# Patient Record
Sex: MALE | Race: WHITE | NOT HISPANIC OR LATINO | Employment: FULL TIME | URBAN - METROPOLITAN AREA
[De-identification: names, ages, dates, MRNs, and addresses within clinical notes are randomized per-mention and may not be internally consistent; named-entity substitution may affect disease eponyms.]

---

## 2021-05-28 ENCOUNTER — TELEPHONE (OUTPATIENT)
Dept: SCHEDULING | Facility: IMAGING CENTER | Age: 56
End: 2021-05-28

## 2021-06-02 ENCOUNTER — TELEPHONE (OUTPATIENT)
Dept: MEDICAL GROUP | Facility: PHYSICIAN GROUP | Age: 56
End: 2021-06-02

## 2021-06-02 NOTE — TELEPHONE ENCOUNTER
Future Appointments       Provider Department Center    6/9/2021 3:00 PM Delbert Reed M.D. [facility] VIS        NEW PATIENT VISIT PRE-VISIT PLANNING    1.  EpicCare Patient is checked in Patient Demographics?Yes    2.  Immunizations were updated in Epic using Reconcile Outside Information activity? Yes         3.  Is this appointment scheduled as a Hospital Follow-Up? No    4.  Patient is due for the following Health Maintenance Topics:   Health Maintenance Due   Topic Date Due   • HEPATITIS C SCREENING  Never done   • COVID-19 Vaccine (1) Never done   • COLONOSCOPY  Never done   • IMM ZOSTER VACCINES (1 of 2) Never done     5.  Reviewed/Updated the following with patient:       •   Preferred Pharmacy? Yes       •   Preferred Lab? Yes       •   Preferred Communication? Yes       •   Allergies? Yes       •   Medications? YES. Was Abstract Encounter opened and chart updated? YES       •   Social History? Yes       •   Family History (document living status of immediate family members and if + hx of  cancer, diabetes, hypertension, hyperlipidemia, heart attack, stroke) Yes    6.  Updated Care Team?       •   DME Company (gait device, O2, CPAP, etc.) NO       •   Other Specialists (eye doctor, derm, GYN, cardiology, endo, etc): N\A    7.  AHA (Puls8) form printed for Provider? N/A   Spoke with patient not a good time, will call again on 06/03/21

## 2021-06-09 ENCOUNTER — OFFICE VISIT (OUTPATIENT)
Dept: MEDICAL GROUP | Facility: PHYSICIAN GROUP | Age: 56
End: 2021-06-09
Payer: COMMERCIAL

## 2021-06-09 VITALS
TEMPERATURE: 98.1 F | WEIGHT: 179 LBS | SYSTOLIC BLOOD PRESSURE: 120 MMHG | HEART RATE: 80 BPM | HEIGHT: 70 IN | OXYGEN SATURATION: 95 % | DIASTOLIC BLOOD PRESSURE: 70 MMHG | RESPIRATION RATE: 16 BRPM | BODY MASS INDEX: 25.62 KG/M2

## 2021-06-09 DIAGNOSIS — E84.9 CYSTIC FIBROSIS (HCC): ICD-10-CM

## 2021-06-09 DIAGNOSIS — K86.81 EXOCRINE PANCREATIC INSUFFICIENCY: ICD-10-CM

## 2021-06-09 DIAGNOSIS — M13.0 POLYARTHROPATHY: ICD-10-CM

## 2021-06-09 DIAGNOSIS — K21.9 GASTROESOPHAGEAL REFLUX DISEASE, UNSPECIFIED WHETHER ESOPHAGITIS PRESENT: ICD-10-CM

## 2021-06-09 DIAGNOSIS — E78.00 HYPERCHOLESTEREMIA: ICD-10-CM

## 2021-06-09 DIAGNOSIS — H90.5 SENSORINEURAL HEARING LOSS, UNILATERAL: ICD-10-CM

## 2021-06-09 DIAGNOSIS — Z86.010 HISTORY OF COLONIC POLYPS: ICD-10-CM

## 2021-06-09 DIAGNOSIS — Z13.228 SCREENING FOR METABOLIC DISORDER: ICD-10-CM

## 2021-06-09 DIAGNOSIS — M06.4 INFLAMMATORY POLYARTHRITIS (HCC): ICD-10-CM

## 2021-06-09 DIAGNOSIS — M12.30 PALINDROMIC RHEUMATISM: ICD-10-CM

## 2021-06-09 PROBLEM — K64.8 INTERNAL HEMORRHOIDS: Status: ACTIVE | Noted: 2020-02-21

## 2021-06-09 PROBLEM — Z00.00 HEALTHCARE MAINTENANCE: Status: ACTIVE | Noted: 2021-06-09

## 2021-06-09 PROBLEM — N46.11 OLIGOSPERMIA: Status: ACTIVE | Noted: 2021-06-09

## 2021-06-09 PROCEDURE — 99204 OFFICE O/P NEW MOD 45 MIN: CPT | Performed by: STUDENT IN AN ORGANIZED HEALTH CARE EDUCATION/TRAINING PROGRAM

## 2021-06-09 ASSESSMENT — PATIENT HEALTH QUESTIONNAIRE - PHQ9: CLINICAL INTERPRETATION OF PHQ2 SCORE: 0

## 2021-06-09 NOTE — ASSESSMENT & PLAN NOTE
influ vaccine - n/a  Pneumo Vaccine - 2015...   ...  will need at 64 as well....  Tetanus - 2018   Shingles - will get at pharmacy....   Colonoscopy - [place] couple years ago ([place])....    .... will be due in 1 year.....  Hep.C.screen - done 2014.- normal.   PSA - n/a   Dexa - n/a  Labs < 12 mo / met screen - ordered.

## 2021-06-09 NOTE — ASSESSMENT & PLAN NOTE
He notes dx of gerd, but states not gerd. ....  He takes PPI, to lower acidity,      to help enzymes remain active when they go      through the stomach, and protective with Naproxen.  Reportedly  Neg endoscopy 1993.  Declines local GI for now.   - can continue on PPI.

## 2021-06-09 NOTE — ASSESSMENT & PLAN NOTE
Patient with late dx of CF.   Taking pancrealipase for this; notes it helps.   Also takes routine Miralax, due to CF.   Per policy of prior CF / GI team.   - declines local GI referral, for now.   - can continue with enzymes and miralax, for now.

## 2021-06-09 NOTE — PROGRESS NOTES
New to Provider  (and [facility])      Gene Tate is a 56 y.o. male.    Reason to establish: New patient to establish      Chief Complaint   Patient presents with   • Establish Care     -  New to [place].             Problems discussed this visit:    This note uses problem-based documentation.  Subjective HPI is included in Assessment & Plan, at bottom of note.   Problem   Esophageal Reflux   Healthcare Maintenance   Internal Hemorrhoids   Hypercholesteremia   Exocrine Pancreatic Insufficiency   Inflammatory Polyarthritis (Hcc)   Palindromic Rheumatism   History of Colonic Polyps   Polyarthropathy   Cystic Fibrosis (Hcc)   Sensorineural Hearing Loss, Unilateral                          Past Medical History:   Diagnosis Date   • Arthritis    • Cystic fibrosis (HCC)    • Exocrine pancreatic insufficiency 11/23/2016   • History of colonic polyps 07/29/2014    2 tubular adenomas - 2014, and again 2019. ... q3y    • Hypercholesteremia 11/30/2018       History reviewed. No pertinent surgical history.       Family History   Problem Relation Age of Onset   • No Known Problems Mother    • No Known Problems Father    • Cystic Fibrosis Maternal Uncle        Social History     Tobacco Use   • Smoking status: Never Smoker   • Smokeless tobacco: Never Used   Substance Use Topics   • Alcohol use: Yes     Comment: 6 drinks per week       Current medications:  (including new changes):  Current Outpatient Medications   Medication Sig Dispense Refill   • predniSONE (DELTASONE) 10 MG Tab 2 TABS DAILY FOR 2 DAYS THEN 1 DAILY FOR 2 DAYS AS NEEDED FOR ARTHRITIS FLARE INDICATIONS: INFLAMMATION OF MORE THAN ONE JOINT     • polyethylene glycol 3350 (MIRALAX) 17 GM/SCOOP Powder Take 17 g by mouth every day.     • Pancrelipase, Lip-Prot-Amyl, 58083-62540 units Cap DR Particles TAKE 8 CAPSULES 3 TIMES A  DAY WITH MEALS AND 4       CAPSULES 3 TIMES A DAY WITHSNACKS     • omeprazole (PRILOSEC) 40 MG delayed-release capsule  "TAKE 1 CAPSULE DAILY     • naproxen (NAPROSYN) 500 MG Tab TAKE 1 TABLET DAILY AND TAKE A SECOND TABLET IF NEEDED FOR PAIN/INFLAMMATION OF MORE THAN 1 JOINT     • methotrexate 2.5 MG Tab TAKE 8 TABLETS ONCE WEEKLY TAKE 4 TABLETS WITH BREAKFAST AND 4TABLETS WITH LUNCH     • hydroxychloroquine (PLAQUENIL) 200 MG Tab Take 2 Tabs by mouth every day.     • folic acid (FOLVITE) 1 MG Tab TAKE 1 TABLET DAILY     • Jdsblfvw-Izlnkvk-Pjiqzs&Ivacaf 100-50-75 & 150 MG Tablet Therapy Pack Trikafta     • zolpidem (AMBIEN) 10 MG Tab Take 5-10 mg by mouth.       No current facility-administered medications for this visit.       Allergies as of 06/09/2021 - Reviewed 06/09/2021   Allergen Reaction Noted   • Sulfa drugs Hives 11/16/2000                    Review of Symptoms  (as noted elsewhere, and .....)    GEN/CONST:   Denies fever, chills, fatigue,    CARDIO:   Denies chest pain, palpitations, or edema.    RESP:   Denies shortness of breath, wheezing, or coughing.  ... but did notice improvement with Trikafta.  GI:    Denies nausea, vomiting,  ... notes some discomfort, and taking pancreatic enyzmes and miralax.    :   Denies dysuria, hematuria,     MSK:  Denies joint pains  or muscle aches.    NEURO:  Denies numbness or focal weakness.       PSYCH:  Denies anxiety, depression, or substance abuse        Physical Exam  /70 (BP Location: Left arm, Patient Position: Sitting, BP Cuff Size: Adult)   Pulse 80   Temp 36.7 °C (98.1 °F) (Temporal)   Resp 16   Ht 1.778 m (5' 10\")   Wt 81.2 kg (179 lb)   SpO2 95%   BMI 25.68 kg/m²   General:  Alert and oriented, No apparent distress.  Neck: Supple. No lymphadenopathy noted. Thyroid not enlarged.   Lungs: Clear to auscultation bilaterally.  No wheezes or rales.     Cardiovascular: Regular rate and rhythm.  No murmurs, or gallops.  Abdomen:  Soft.  Non-distended.  Non-tender.     Extremities: No pedal edema.  Good general motion of extremities.    Psychological: Appears to have " "normal mood and affect.        Labs:  Not able to review old/prior labs well.    (intermittent access to outside records / online).   - ordering new labs.                   Health Maintenance Review     influ vaccine - n/a  Pneumo Vaccine - 2015...   ...  will need at 64 as well....  Tetanus - 2018   Shingles - will get at pharmacy....   Colonoscopy - [place] couple years ago ([place])....    .... will be due in 1 year.....  Hep.C.screen - done 2014.- normal.   PSA - n/a   Dexa - n/a  Labs < 12 mo / met screen - ordered.                                  Assessment & Plan  (with subjective history and orders):  Of note, the list below is not in a specific nor sortable order.      Problem List Items Addressed This Visit     Cystic fibrosis (HCC)     Patient notes having frequent illness all his life, but only diagnosed with CF at age 49, after friend recognized some of the odd symptoms (and they happened to be a ), so was tested and found to have CF.   Previously told \"Mild cystic fibrosis largely manifest with pancreatic insufficiency, mild pulmonary involvement and recent treatment for palindromic rheumatism with hydroxychloroquine, naproxen, and methotrexate (2017).\"  ... from online import.  He notes mild lung symptoms, which improved after starting Trikafta.  ...  Mostly GI complaints which are helped with Pancreatic enzymes.   - continues to follow-up with [place] for this issue.   - declines referral to local GI for now (but will need one in a year, for future colonoscopy follow-up).   - [place] to manage, for now.          Relevant Orders    CBC WITH DIFFERENTIAL    Comp Metabolic Panel    Esophageal reflux (more medication management)     He notes dx of gerd, but states not gerd. ....  He takes PPI, to lower acidity,      to help enzymes remain active when they go      through the stomach, and protective with Naproxen.  Reportedly  Neg endoscopy 1993.  Declines local GI for now.   - can continue on PPI.     "       Exocrine pancreatic insufficiency     Patient with late dx of CF.   Taking pancrealipase for this; notes it helps.   Also takes routine Miralax, due to CF.   Per policy of prior CF / GI team.   - declines local GI referral, for now.   - can continue with enzymes and miralax, for now.          History of colonic polyps     Last colonoscopy in 2019, with 2 polyps.   Online record import notes they were tubular adenomas.   Told to follow-up in 3 years... (next in 2022).   - notes he is willing to follow-up with this next year.         Hypercholesteremia     Patient unaware of prior cholesterol issues.   Had been listed in imported problem list.   Prior labs with elevated LDL and total Cholesterol.  Denies:  angina, palpitations, or dyspnea   - will get new labs for further evaluation.          Relevant Orders    Lipid Profile    Inflammatory polyarthritis (HCC)    Relevant Orders    REFERRAL TO RHEUMATOLOGY    Palindromic rheumatism     Patient notes recurrent issues with variable arthritis.   He states he will have flairs of various different joints  (usually one at a time), where an individual joint will  become large, swollen, warm, and tender....  He states he has been told it is related to his CF.   Has been using short-course of steroids, when it flairs.  Uses prednisone:     Rx for 20 mg for 2 days, then 10 mg for 2 days ....        But states he takes it as ...   30 mg d, for 1-2 days.   Currently not an issue (not active).   - listed for reference.          Relevant Orders    REFERRAL TO RHEUMATOLOGY    Polyarthropathy    Relevant Orders    REFERRAL TO RHEUMATOLOGY    Sensorineural hearing loss, unilateral     Notes rare episodes of hearing loss.....  He states he has a published paper about it at home.   He states that he takes moderate to high dose steroids,  temporarily when this happens....  (he's uncertain, but thinks it's 80 mg d, for a few days).   (... UpToDate notes 60 mg d, for 10 days)  (...  and if bad, then intratympanic dexamethasone inj, weekly for 3 weeks)   - not current issue. (not active).   - listed for reference.         Relevant Orders    CBC WITH DIFFERENTIAL    Comp Metabolic Panel      Other Visit Diagnoses     Screening for metabolic disorder        Relevant Orders    CBC WITH DIFFERENTIAL    Comp Metabolic Panel    Lipid Profile        Of note, the above list is not in a specific nor sortable order.                  Diagnosis Table, with orders:  1. Cystic fibrosis (HCC)  CBC WITH DIFFERENTIAL    Comp Metabolic Panel   2. Exocrine pancreatic insufficiency     3. Gastroesophageal reflux disease, unspecified whether esophagitis present     4. Hypercholesteremia  Lipid Profile   5. Screening for metabolic disorder  CBC WITH DIFFERENTIAL    Comp Metabolic Panel    Lipid Profile   6. Palindromic rheumatism  REFERRAL TO RHEUMATOLOGY   7. Inflammatory polyarthritis (HCC)  REFERRAL TO RHEUMATOLOGY   8. Polyarthropathy  REFERRAL TO RHEUMATOLOGY   9. Sensorineural hearing loss, unilateral  CBC WITH DIFFERENTIAL    Comp Metabolic Panel   10. History of colonic polyps                   Follow-up:    3 weeks (general review / Labs)              A computerized dictation system may have been used in this note.    Despite review, there may be some errors in spelling or grammar.    Delbert Reed M.D.  6/9/2021

## 2021-06-09 NOTE — ASSESSMENT & PLAN NOTE
Notes rare episodes of hearing loss.....  He states he has a published paper about it at home.   He states that he takes moderate to high dose steroids,  temporarily when this happens....  (he's uncertain, but thinks it's 80 mg d, for a few days).   (... UpToDate notes 60 mg d, for 10 days)  (... and if bad, then intratympanic dexamethasone inj, weekly for 3 weeks)   - not current issue. (not active).   - listed for reference.

## 2021-06-09 NOTE — ASSESSMENT & PLAN NOTE
Patient unaware of prior cholesterol issues.   Had been listed in imported problem list.   Prior labs with elevated LDL and total Cholesterol.  Denies:  angina, palpitations, or dyspnea   - will get new labs for further evaluation.

## 2021-06-09 NOTE — ASSESSMENT & PLAN NOTE
"Patient notes having frequent illness all his life, but only diagnosed with CF at age 49, after friend recognized some of the odd symptoms (and they happened to be a ), so was tested and found to have CF.   Previously told \"Mild cystic fibrosis largely manifest with pancreatic insufficiency, mild pulmonary involvement and recent treatment for palindromic rheumatism with hydroxychloroquine, naproxen, and methotrexate (2017).\"  ... from online import.  He notes mild lung symptoms, which improved after starting Trikafta.  ...  Mostly GI complaints which are helped with Pancreatic enzymes.   - continues to follow-up with [facility] for this issue.   - declines referral to local GI for now (but will need one in a year, for future colonoscopy follow-up).   - [place] to manage, for now.   "

## 2021-06-10 NOTE — ASSESSMENT & PLAN NOTE
Last colonoscopy in 2019, with 2 polyps.   Online record import notes they were tubular adenomas.   Told to follow-up in 3 years... (next in 2022).   - notes he is willing to follow-up with this next year.

## 2021-06-25 ENCOUNTER — HOSPITAL ENCOUNTER (OUTPATIENT)
Dept: LAB | Facility: MEDICAL CENTER | Age: 56
End: 2021-06-25
Attending: STUDENT IN AN ORGANIZED HEALTH CARE EDUCATION/TRAINING PROGRAM
Payer: COMMERCIAL

## 2021-06-25 DIAGNOSIS — E78.00 HYPERCHOLESTEREMIA: ICD-10-CM

## 2021-06-25 DIAGNOSIS — H90.5 SENSORINEURAL HEARING LOSS, UNILATERAL: ICD-10-CM

## 2021-06-25 DIAGNOSIS — Z13.228 SCREENING FOR METABOLIC DISORDER: ICD-10-CM

## 2021-06-25 DIAGNOSIS — E84.9 CYSTIC FIBROSIS (HCC): ICD-10-CM

## 2021-06-25 LAB
ALBUMIN SERPL BCP-MCNC: 4.4 G/DL (ref 3.2–4.9)
ALBUMIN/GLOB SERPL: 1.8 G/DL
ALP SERPL-CCNC: 79 U/L (ref 30–99)
ALT SERPL-CCNC: 23 U/L (ref 2–50)
ANION GAP SERPL CALC-SCNC: 11 MMOL/L (ref 7–16)
AST SERPL-CCNC: 19 U/L (ref 12–45)
BASOPHILS # BLD AUTO: 0.3 % (ref 0–1.8)
BASOPHILS # BLD: 0.02 K/UL (ref 0–0.12)
BILIRUB SERPL-MCNC: 0.5 MG/DL (ref 0.1–1.5)
BUN SERPL-MCNC: 23 MG/DL (ref 8–22)
CALCIUM SERPL-MCNC: 9.5 MG/DL (ref 8.5–10.5)
CHLORIDE SERPL-SCNC: 104 MMOL/L (ref 96–112)
CHOLEST SERPL-MCNC: 235 MG/DL (ref 100–199)
CO2 SERPL-SCNC: 24 MMOL/L (ref 20–33)
CREAT SERPL-MCNC: 0.83 MG/DL (ref 0.5–1.4)
EOSINOPHIL # BLD AUTO: 0 K/UL (ref 0–0.51)
EOSINOPHIL NFR BLD: 0 % (ref 0–6.9)
ERYTHROCYTE [DISTWIDTH] IN BLOOD BY AUTOMATED COUNT: 53 FL (ref 35.9–50)
FASTING STATUS PATIENT QL REPORTED: NORMAL
GLOBULIN SER CALC-MCNC: 2.5 G/DL (ref 1.9–3.5)
GLUCOSE SERPL-MCNC: 121 MG/DL (ref 65–99)
HCT VFR BLD AUTO: 52.4 % (ref 42–52)
HDLC SERPL-MCNC: 69 MG/DL
HGB BLD-MCNC: 17.1 G/DL (ref 14–18)
IMM GRANULOCYTES # BLD AUTO: 0.05 K/UL (ref 0–0.11)
IMM GRANULOCYTES NFR BLD AUTO: 0.8 % (ref 0–0.9)
LDLC SERPL CALC-MCNC: 157 MG/DL
LYMPHOCYTES # BLD AUTO: 0.68 K/UL (ref 1–4.8)
LYMPHOCYTES NFR BLD: 11.2 % (ref 22–41)
MCH RBC QN AUTO: 30.2 PG (ref 27–33)
MCHC RBC AUTO-ENTMCNC: 32.6 G/DL (ref 33.7–35.3)
MCV RBC AUTO: 92.6 FL (ref 81.4–97.8)
MONOCYTES # BLD AUTO: 0.19 K/UL (ref 0–0.85)
MONOCYTES NFR BLD AUTO: 3.1 % (ref 0–13.4)
NEUTROPHILS # BLD AUTO: 5.13 K/UL (ref 1.82–7.42)
NEUTROPHILS NFR BLD: 84.6 % (ref 44–72)
NRBC # BLD AUTO: 0 K/UL
NRBC BLD-RTO: 0 /100 WBC
PLATELET # BLD AUTO: 301 K/UL (ref 164–446)
PMV BLD AUTO: 9.2 FL (ref 9–12.9)
POTASSIUM SERPL-SCNC: 5.3 MMOL/L (ref 3.6–5.5)
PROT SERPL-MCNC: 6.9 G/DL (ref 6–8.2)
RBC # BLD AUTO: 5.66 M/UL (ref 4.7–6.1)
SODIUM SERPL-SCNC: 139 MMOL/L (ref 135–145)
TRIGL SERPL-MCNC: 44 MG/DL (ref 0–149)
WBC # BLD AUTO: 6.1 K/UL (ref 4.8–10.8)

## 2021-06-25 PROCEDURE — 36415 COLL VENOUS BLD VENIPUNCTURE: CPT

## 2021-06-25 PROCEDURE — 80061 LIPID PANEL: CPT

## 2021-06-25 PROCEDURE — 85025 COMPLETE CBC W/AUTO DIFF WBC: CPT

## 2021-06-25 PROCEDURE — 80053 COMPREHEN METABOLIC PANEL: CPT

## 2022-01-03 ENCOUNTER — TELEPHONE (OUTPATIENT)
Dept: MEDICAL GROUP | Facility: PHYSICIAN GROUP | Age: 57
End: 2022-01-03

## 2022-01-03 ENCOUNTER — APPOINTMENT (OUTPATIENT)
Dept: RADIOLOGY | Facility: MEDICAL CENTER | Age: 57
End: 2022-01-03
Attending: EMERGENCY MEDICINE
Payer: COMMERCIAL

## 2022-01-03 ENCOUNTER — HOSPITAL ENCOUNTER (EMERGENCY)
Facility: MEDICAL CENTER | Age: 57
End: 2022-01-03
Attending: EMERGENCY MEDICINE
Payer: COMMERCIAL

## 2022-01-03 VITALS
RESPIRATION RATE: 16 BRPM | SYSTOLIC BLOOD PRESSURE: 131 MMHG | DIASTOLIC BLOOD PRESSURE: 84 MMHG | HEART RATE: 88 BPM | OXYGEN SATURATION: 92 % | WEIGHT: 175 LBS | HEIGHT: 70 IN | TEMPERATURE: 97.6 F | BODY MASS INDEX: 25.05 KG/M2

## 2022-01-03 DIAGNOSIS — Z20.822 SUSPECTED COVID-19 VIRUS INFECTION: ICD-10-CM

## 2022-01-03 PROCEDURE — M0243 CASIRIVI AND IMDEVI INFUSION: HCPCS

## 2022-01-03 PROCEDURE — U0005 INFEC AGEN DETEC AMPLI PROBE: HCPCS

## 2022-01-03 PROCEDURE — 700111 HCHG RX REV CODE 636 W/ 250 OVERRIDE (IP): Performed by: EMERGENCY MEDICINE

## 2022-01-03 PROCEDURE — 71045 X-RAY EXAM CHEST 1 VIEW: CPT

## 2022-01-03 PROCEDURE — 99283 EMERGENCY DEPT VISIT LOW MDM: CPT

## 2022-01-03 PROCEDURE — U0003 INFECTIOUS AGENT DETECTION BY NUCLEIC ACID (DNA OR RNA); SEVERE ACUTE RESPIRATORY SYNDROME CORONAVIRUS 2 (SARS-COV-2) (CORONAVIRUS DISEASE [COVID-19]), AMPLIFIED PROBE TECHNIQUE, MAKING USE OF HIGH THROUGHPUT TECHNOLOGIES AS DESCRIBED BY CMS-2020-01-R: HCPCS

## 2022-01-03 RX ADMIN — CASIRIVIMAB AND IMDEVIMAB 300 MG: 600; 600 INJECTION, SOLUTION, CONCENTRATE INTRAVENOUS at 20:43

## 2022-01-03 ASSESSMENT — FIBROSIS 4 INDEX: FIB4 SCORE: 0.75

## 2022-01-03 NOTE — TELEPHONE ENCOUNTER
This office was contacted through his wife's [employer] message, about the patient having covid.  ...  Informed that the patient had tested positive for Covid, with cough and phlegm, diarrhea, and headache.    … Called the patient back, on his cell phone.  … By time I reach him on the phone, he had already spoken to his doctors at [facility] (who manage his cystic fibrosis).…  They had already recommended that the patient follow-up at the ER, given his cystic fibrosis, and concern for worsening breathing.    I spoke with the patient, and he and his wife were already in the car, on the way to the ER.…   The ER can evaluate and further manage the patient for this issue (as he is already on the way there).     Delbert Reed M.D., 1/3/2022

## 2022-01-04 LAB
SARS-COV-2 RNA RESP QL NAA+PROBE: DETECTED
SPECIMEN SOURCE: ABNORMAL

## 2022-01-04 NOTE — ED PROVIDER NOTES
ED Provider Note    Scribed for Fadi Morrow M.D. by Jesus Robles. 1/3/2022  7:39 PM    Primary care provider: Delbert Reed M.D.  Means of arrival: walk in  History obtained from: Patient  History limited by: None    CHIEF COMPLAINT  Chief Complaint   Patient presents with    Coronavirus Screening     positive home test Saturday, minimal sx       HPI  Gene Tate is a 56 y.o. male with history of cystic fibrosis who presents to the Emergency Department with coronavirus symptoms for the past few days. The patient had a positive at-home covid test on 1/1. He reports associated productive cough, diarrhea, and headache. He denies any nausea, vomiting, or shortness of breath. The patient spoke to his doctors at [facility] and was advised to come to the ER given his cystic fibrosis. He is fully vaccinated against COVID-19 and has received his booster.    REVIEW OF SYSTEMS  Pertinent positives include cough, diarrhea, headache. Pertinent negatives include no nausea, vomiting, or shortness of breath.   See HPI for further details.     PAST MEDICAL HISTORY   has a past medical history of Arthritis, Cystic fibrosis (HCC), Exocrine pancreatic insufficiency (11/23/2016), History of colonic polyps (07/29/2014), and Hypercholesteremia (11/30/2018).    SURGICAL HISTORY  patient denies any surgical history    SOCIAL HISTORY  Social History     Tobacco Use    Smoking status: Never Smoker    Smokeless tobacco: Never Used   Vaping Use    Vaping Use: Never used   Substance Use Topics    Alcohol use: Yes     Comment: 6 drinks per week    Drug use: Never      Social History     Substance and Sexual Activity   Drug Use Never       FAMILY HISTORY  Family History   Problem Relation Age of Onset    No Known Problems Mother     No Known Problems Father     Cystic Fibrosis Maternal Uncle        CURRENT MEDICATIONS  Home Medications       Reviewed by Sisi Howard R.N. (Registered Nurse) on 01/03/22 at 1937  Med List  "Status: Partial     Medication Last Dose Status   Bsdbqbtq-Lnuwhrf-Chobqa&Ivacaf 100-50-75 & 150 MG Tablet Therapy Pack  Active   folic acid (FOLVITE) 1 MG Tab  Active   hydroxychloroquine (PLAQUENIL) 200 MG Tab  Active   methotrexate 2.5 MG Tab  Active   naproxen (NAPROSYN) 500 MG Tab  Active   omeprazole (PRILOSEC) 40 MG delayed-release capsule  Active   Pancrelipase, Lip-Prot-Amyl, 90939-27166 units Cap DR Particles  Active   polyethylene glycol 3350 (MIRALAX) 17 GM/SCOOP Powder  Active   predniSONE (DELTASONE) 10 MG Tab  Active   zolpidem (AMBIEN) 10 MG Tab  Active                    ALLERGIES  Allergies   Allergen Reactions    Sulfa Drugs Hives     rash       PHYSICAL EXAM  VITAL SIGNS: /95   Pulse (!) 120   Temp 36.6 °C (97.8 °F) (Temporal)   Resp 18   Ht 1.778 m (5' 10\")   Wt 79.4 kg (175 lb)   SpO2 95%   BMI 25.11 kg/m²     Constitutional: Well developed, Well nourished,no acute distress, Non-toxic appearance.   HENT: Normocephalic, Atraumatic.  Oropharynx moist.   Eyes: PERRL, EOMI, Conjunctiva normal, No discharge.   Neck: no anterior cervical lymphadenopathy  CV: Good pulses  Thorax & Lungs: No respiratory distress.   Abdomen:  Soft, non-tender.  No rebound, no peritoneal signs.   Skin: Warm, Dry, No erythema, No rash.    Musculoskeletal: No major deformities noted.   Neurologic: Awake, alert. Moves all extremities spontaneously.  Psychiatric: Affect normal, Mood normal.     LABS  Results for orders placed or performed during the hospital encounter of 01/03/22   SARS-CoV-2 PCR (24 hour In-House): Collect NP swab in VTM    Specimen: Respirate   Result Value Ref Range    SARS-CoV-2 Source NP Swab        All labs reviewed by me.    RADIOLOGY  DX-CHEST-PORTABLE (1 VIEW)   Final Result      No acute cardiopulmonary abnormality.           The radiologist's interpretation of all radiological studies have been reviewed by me.    COURSE & MEDICAL DECISION MAKING  Nursing notes, VS, PMSFHx reviewed in " chart.    7:39 PM - Patient seen and examined at bedside. Patient will be treated with regeneron 300 mg. Ordered DX chest, SARS-CoV-2 PCR to evaluate his symptoms.     8:46 PM - Patient seen at bedside. Discussed x-ray results with the patient and updated them on the plan of care, including discharge after regeneron treatment. I answered all questions regarding their care. Patient verbalizes understanding and agreement to this plan of care. The patient will return for new or worsening symptoms and is stable at the time of discharge.  Patient patient with pre-existing lung disease, likely Covid but normal pulse oximetry normal chest x-ray.  Patient will be given Regeneron because of the pre-existing disease       DISPOSITION:  Patient will be discharged home in stable condition.    FOLLOW UP:  Delbert Reed M.D.  [address] [facility] [address]  [phone]            OUTPATIENT MEDICATIONS:  New Prescriptions    No medications on file         FINAL IMPRESSION  1. Suspected COVID-19 virus infection     2 COVID-19     IJesus (Bruce), am scribing for, and in the presence of, Fadi Morrow M.D..    Electronically signed by: Jesus Robles (Bruce), 1/3/2022    Fadi TABOR M.D. personally performed the services described in this documentation, as scribed by Jesus Robles in my presence, and it is both accurate and complete.    The note accurately reflects work and decisions made by me.  Fadi Morrow M.D.  1/3/2022  10:54 PM

## 2022-01-04 NOTE — ED TRIAGE NOTES
Chief Complaint   Patient presents with   • Coronavirus Screening     positive home test Saturday, minimal sx     Pt is vaccinated + booster

## 2022-06-12 SDOH — ECONOMIC STABILITY: TRANSPORTATION INSECURITY
IN THE PAST 12 MONTHS, HAS LACK OF TRANSPORTATION KEPT YOU FROM MEETINGS, WORK, OR FROM GETTING THINGS NEEDED FOR DAILY LIVING?: NO

## 2022-06-12 SDOH — ECONOMIC STABILITY: FOOD INSECURITY: WITHIN THE PAST 12 MONTHS, THE FOOD YOU BOUGHT JUST DIDN'T LAST AND YOU DIDN'T HAVE MONEY TO GET MORE.: NEVER TRUE

## 2022-06-12 SDOH — ECONOMIC STABILITY: INCOME INSECURITY: IN THE LAST 12 MONTHS, WAS THERE A TIME WHEN YOU WERE NOT ABLE TO PAY THE MORTGAGE OR RENT ON TIME?: NO

## 2022-06-12 SDOH — HEALTH STABILITY: MENTAL HEALTH
STRESS IS WHEN SOMEONE FEELS TENSE, NERVOUS, ANXIOUS, OR CAN'T SLEEP AT NIGHT BECAUSE THEIR MIND IS TROUBLED. HOW STRESSED ARE YOU?: ONLY A LITTLE

## 2022-06-12 SDOH — HEALTH STABILITY: PHYSICAL HEALTH: ON AVERAGE, HOW MANY MINUTES DO YOU ENGAGE IN EXERCISE AT THIS LEVEL?: 50 MIN

## 2022-06-12 SDOH — ECONOMIC STABILITY: FOOD INSECURITY: WITHIN THE PAST 12 MONTHS, YOU WORRIED THAT YOUR FOOD WOULD RUN OUT BEFORE YOU GOT MONEY TO BUY MORE.: NEVER TRUE

## 2022-06-12 SDOH — ECONOMIC STABILITY: HOUSING INSECURITY
IN THE LAST 12 MONTHS, WAS THERE A TIME WHEN YOU DID NOT HAVE A STEADY PLACE TO SLEEP OR SLEPT IN A SHELTER (INCLUDING NOW)?: NO

## 2022-06-12 SDOH — HEALTH STABILITY: PHYSICAL HEALTH: ON AVERAGE, HOW MANY DAYS PER WEEK DO YOU ENGAGE IN MODERATE TO STRENUOUS EXERCISE (LIKE A BRISK WALK)?: 3 DAYS

## 2022-06-12 SDOH — ECONOMIC STABILITY: TRANSPORTATION INSECURITY
IN THE PAST 12 MONTHS, HAS THE LACK OF TRANSPORTATION KEPT YOU FROM MEDICAL APPOINTMENTS OR FROM GETTING MEDICATIONS?: NO

## 2022-06-12 SDOH — ECONOMIC STABILITY: HOUSING INSECURITY: IN THE LAST 12 MONTHS, HOW MANY PLACES HAVE YOU LIVED?: 1

## 2022-06-12 SDOH — ECONOMIC STABILITY: INCOME INSECURITY: HOW HARD IS IT FOR YOU TO PAY FOR THE VERY BASICS LIKE FOOD, HOUSING, MEDICAL CARE, AND HEATING?: NOT HARD AT ALL

## 2022-06-12 SDOH — ECONOMIC STABILITY: TRANSPORTATION INSECURITY
IN THE PAST 12 MONTHS, HAS LACK OF RELIABLE TRANSPORTATION KEPT YOU FROM MEDICAL APPOINTMENTS, MEETINGS, WORK OR FROM GETTING THINGS NEEDED FOR DAILY LIVING?: NO

## 2022-06-12 ASSESSMENT — SOCIAL DETERMINANTS OF HEALTH (SDOH)
HOW OFTEN DO YOU ATTEND CHURCH OR RELIGIOUS SERVICES?: NEVER
WITHIN THE PAST 12 MONTHS, YOU WORRIED THAT YOUR FOOD WOULD RUN OUT BEFORE YOU GOT THE MONEY TO BUY MORE: NEVER TRUE
HOW OFTEN DO YOU ATTENT MEETINGS OF THE CLUB OR ORGANIZATION YOU BELONG TO?: NEVER
DO YOU BELONG TO ANY CLUBS OR ORGANIZATIONS SUCH AS CHURCH GROUPS UNIONS, FRATERNAL OR ATHLETIC GROUPS, OR SCHOOL GROUPS?: NO
HOW OFTEN DO YOU GET TOGETHER WITH FRIENDS OR RELATIVES?: THREE TIMES A WEEK
IN A TYPICAL WEEK, HOW MANY TIMES DO YOU TALK ON THE PHONE WITH FAMILY, FRIENDS, OR NEIGHBORS?: MORE THAN THREE TIMES A WEEK
HOW OFTEN DO YOU ATTENT MEETINGS OF THE CLUB OR ORGANIZATION YOU BELONG TO?: NEVER
HOW OFTEN DO YOU HAVE A DRINK CONTAINING ALCOHOL: 2-3 TIMES A WEEK
HOW MANY DRINKS CONTAINING ALCOHOL DO YOU HAVE ON A TYPICAL DAY WHEN YOU ARE DRINKING: 1 OR 2
IN A TYPICAL WEEK, HOW MANY TIMES DO YOU TALK ON THE PHONE WITH FAMILY, FRIENDS, OR NEIGHBORS?: MORE THAN THREE TIMES A WEEK
HOW OFTEN DO YOU HAVE SIX OR MORE DRINKS ON ONE OCCASION: NEVER
HOW HARD IS IT FOR YOU TO PAY FOR THE VERY BASICS LIKE FOOD, HOUSING, MEDICAL CARE, AND HEATING?: NOT HARD AT ALL
DO YOU BELONG TO ANY CLUBS OR ORGANIZATIONS SUCH AS CHURCH GROUPS UNIONS, FRATERNAL OR ATHLETIC GROUPS, OR SCHOOL GROUPS?: NO
HOW OFTEN DO YOU ATTEND CHURCH OR RELIGIOUS SERVICES?: NEVER
HOW OFTEN DO YOU GET TOGETHER WITH FRIENDS OR RELATIVES?: THREE TIMES A WEEK

## 2022-06-12 ASSESSMENT — LIFESTYLE VARIABLES
AUDIT-C TOTAL SCORE: 3
SKIP TO QUESTIONS 9-10: 1
HOW OFTEN DO YOU HAVE A DRINK CONTAINING ALCOHOL: 2-3 TIMES A WEEK
HOW OFTEN DO YOU HAVE SIX OR MORE DRINKS ON ONE OCCASION: NEVER
HOW MANY STANDARD DRINKS CONTAINING ALCOHOL DO YOU HAVE ON A TYPICAL DAY: 1 OR 2

## 2022-06-15 ENCOUNTER — OFFICE VISIT (OUTPATIENT)
Dept: MEDICAL GROUP | Facility: PHYSICIAN GROUP | Age: 57
End: 2022-06-15
Payer: COMMERCIAL

## 2022-06-15 VITALS
DIASTOLIC BLOOD PRESSURE: 70 MMHG | SYSTOLIC BLOOD PRESSURE: 122 MMHG | HEIGHT: 70 IN | BODY MASS INDEX: 26.51 KG/M2 | OXYGEN SATURATION: 94 % | TEMPERATURE: 97.5 F | RESPIRATION RATE: 16 BRPM | HEART RATE: 81 BPM | WEIGHT: 185.19 LBS

## 2022-06-15 DIAGNOSIS — H90.5 SENSORINEURAL HEARING LOSS, UNILATERAL: ICD-10-CM

## 2022-06-15 DIAGNOSIS — E84.9 CYSTIC FIBROSIS (HCC): ICD-10-CM

## 2022-06-15 DIAGNOSIS — E78.00 HYPERCHOLESTEREMIA: ICD-10-CM

## 2022-06-15 DIAGNOSIS — M12.30 PALINDROMIC RHEUMATISM: ICD-10-CM

## 2022-06-15 DIAGNOSIS — M06.4 INFLAMMATORY POLYARTHRITIS (HCC): ICD-10-CM

## 2022-06-15 DIAGNOSIS — K86.81 EXOCRINE PANCREATIC INSUFFICIENCY: ICD-10-CM

## 2022-06-15 DIAGNOSIS — Z86.010 HISTORY OF COLONIC POLYPS: Primary | ICD-10-CM

## 2022-06-15 PROCEDURE — 99386 PREV VISIT NEW AGE 40-64: CPT | Performed by: STUDENT IN AN ORGANIZED HEALTH CARE EDUCATION/TRAINING PROGRAM

## 2022-06-15 RX ORDER — OMEPRAZOLE 40 MG/1
1 CAPSULE, DELAYED RELEASE ORAL DAILY
COMMUNITY
Start: 2022-03-23 | End: 2023-06-29

## 2022-06-15 RX ORDER — FOLIC ACID 1 MG/1
1 TABLET ORAL DAILY
COMMUNITY
Start: 2022-03-22

## 2022-06-15 RX ORDER — ELEXACAFTOR, TEZACAFTOR, AND IVACAFTOR 100-50-75
KIT ORAL
COMMUNITY
Start: 2022-05-23

## 2022-06-15 ASSESSMENT — ENCOUNTER SYMPTOMS
COUGH: 0
FEVER: 0
DIZZINESS: 0
CHILLS: 0
HEADACHES: 0
ORTHOPNEA: 0
WHEEZING: 0
FOCAL WEAKNESS: 0
SHORTNESS OF BREATH: 0
PALPITATIONS: 0

## 2022-06-15 ASSESSMENT — PATIENT HEALTH QUESTIONNAIRE - PHQ9: CLINICAL INTERPRETATION OF PHQ2 SCORE: 0

## 2022-06-15 ASSESSMENT — FIBROSIS 4 INDEX: FIB4 SCORE: 0.76

## 2022-06-15 NOTE — ASSESSMENT & PLAN NOTE
Cont current regime, will refer to rheum to est care in this area as he has recently moved from [place]

## 2022-06-15 NOTE — PROGRESS NOTES
"Subjective:   HISTORY OF THE PRESENT ILLNESS: Patient is a 57 y.o. male here today to establish care. His/her prior PCP was [facility]    Problem   Hypercholesteremia   Exocrine Pancreatic Insufficiency   Palindromic Rheumatism    On methotrexate 2.5mg and hydroxychloroquine. flares up once every 2 weeks last for 3 days and then takes prednisone to help symptoms.      History of Colonic Polyps      Colonoscopy - [facility]/ Remberto Vega MD  Date of Procedure [2]: 3/13/2019  Tubular adenoma(s) [3]: 2  Also 2 tubular adenomas in 2014  Reports needing q3y, due now.      Cystic Fibrosis (Hcc)    Diagnosed at age 49. \"Mild cystic fibrosis largely manifest with pancreatic insufficiency, mild pulmonary involvement and recent treatment for palindromic rheumatism with hydroxychloroquine, naproxen, and methotrexate (2017).\"    Seen at [facility].        Sensorineural Hearing Loss, Unilateral      Current Outpatient Medications Ordered in Epic   Medication Sig Dispense Refill   • omeprazole (PRILOSEC) 40 MG delayed-release capsule Take 1 Capsule by mouth every day.     • Uqqwgsss-Wcpyhjq-Xwzstj&Ivacaf (TRIKAFTA) 100-50-75 & 150 MG Tablet Therapy Pack Take  by mouth.     • folic acid (FOLVITE) 1 MG Tab Take 1 Tablet by mouth every day.     • predniSONE (DELTASONE) 10 MG Tab 2 TABS DAILY FOR 2 DAYS THEN 1 DAILY FOR 2 DAYS AS NEEDED FOR ARTHRITIS FLARE INDICATIONS: INFLAMMATION OF MORE THAN ONE JOINT     • polyethylene glycol 3350 (MIRALAX) 17 GM/SCOOP Powder Take 17 g by mouth every day.     • Pancrelipase, Lip-Prot-Amyl, 10283-05414 units Cap DR Particles TAKE 8 CAPSULES 3 TIMES A  DAY WITH MEALS AND 4       CAPSULES 3 TIMES A DAY WITHSNACKS     • naproxen (NAPROSYN) 500 MG Tab TAKE 1 TABLET DAILY AND TAKE A SECOND TABLET IF NEEDED FOR PAIN/INFLAMMATION OF MORE THAN 1 JOINT     • methotrexate 2.5 MG Tab TAKE 8 TABLETS ONCE WEEKLY TAKE 4 TABLETS WITH BREAKFAST AND 4TABLETS WITH LUNCH     • hydroxychloroquine (PLAQUENIL) 200 MG Tab " Take 2 Tabs by mouth every day.     • zolpidem (AMBIEN) 10 MG Tab Take 5-10 mg by mouth.       No current Epic-ordered facility-administered medications on file.       Past Medical History:   Diagnosis Date   • Arthritis    • Cystic fibrosis (HCC)    • Exocrine pancreatic insufficiency 11/23/2016   • History of colonic polyps 07/29/2014    2 tubular adenomas - 2014, and again 2019. ... q3y    • Hypercholesteremia 11/30/2018     No past surgical history on file.  Social History     Tobacco Use   • Smoking status: Never Smoker   • Smokeless tobacco: Never Used   Vaping Use   • Vaping Use: Never used   Substance Use Topics   • Alcohol use: Yes     Alcohol/week: 3.6 oz     Types: 4 Glasses of wine, 2 Standard drinks or equivalent per week     Comment: 6 drinks per week   • Drug use: Never      Family History   Problem Relation Age of Onset   • Ovarian Cancer Mother         Ovarian Cancer   • Cancer Father         Esophageal cancer   • Cystic Fibrosis Maternal Uncle    • Lung Disease Maternal Uncle         Cystic Fibrosis     Current Outpatient Medications   Medication Sig Dispense Refill   • omeprazole (PRILOSEC) 40 MG delayed-release capsule Take 1 Capsule by mouth every day.     • Qzjrneip-Jvgzqzn-Wadtfs&Ivacaf (TRIKAFTA) 100-50-75 & 150 MG Tablet Therapy Pack Take  by mouth.     • folic acid (FOLVITE) 1 MG Tab Take 1 Tablet by mouth every day.     • predniSONE (DELTASONE) 10 MG Tab 2 TABS DAILY FOR 2 DAYS THEN 1 DAILY FOR 2 DAYS AS NEEDED FOR ARTHRITIS FLARE INDICATIONS: INFLAMMATION OF MORE THAN ONE JOINT     • polyethylene glycol 3350 (MIRALAX) 17 GM/SCOOP Powder Take 17 g by mouth every day.     • Pancrelipase, Lip-Prot-Amyl, 54493-15816 units Cap DR Particles TAKE 8 CAPSULES 3 TIMES A  DAY WITH MEALS AND 4       CAPSULES 3 TIMES A DAY WITHSNACKS     • naproxen (NAPROSYN) 500 MG Tab TAKE 1 TABLET DAILY AND TAKE A SECOND TABLET IF NEEDED FOR PAIN/INFLAMMATION OF MORE THAN 1 JOINT     • methotrexate 2.5 MG Tab TAKE  "8 TABLETS ONCE WEEKLY TAKE 4 TABLETS WITH BREAKFAST AND 4TABLETS WITH LUNCH     • hydroxychloroquine (PLAQUENIL) 200 MG Tab Take 2 Tabs by mouth every day.     • zolpidem (AMBIEN) 10 MG Tab Take 5-10 mg by mouth.       No current facility-administered medications for this visit.       Health Maintenance: Completed    Review of Systems   Constitutional: Negative for chills and fever.   Respiratory: Negative for cough, shortness of breath and wheezing.    Cardiovascular: Negative for chest pain, palpitations, orthopnea and leg swelling.   Musculoskeletal: Negative for joint pain.   Neurological: Negative for dizziness, focal weakness and headaches.          Objective:     Exam: /70   Pulse 81   Temp 36.4 °C (97.5 °F) (Temporal)   Resp 16   Ht 1.778 m (5' 10\")   Wt 84 kg (185 lb 3 oz)   SpO2 94%  Body mass index is 26.57 kg/m².    Physical Exam  Vitals reviewed.   Constitutional:       General: He is not in acute distress.     Appearance: Normal appearance. He is not ill-appearing or toxic-appearing.   HENT:      Head: Normocephalic and atraumatic.   Eyes:      General: No scleral icterus.        Right eye: No discharge.         Left eye: No discharge.      Conjunctiva/sclera: Conjunctivae normal.   Neck:      Vascular: No carotid bruit.   Cardiovascular:      Rate and Rhythm: Normal rate and regular rhythm.      Pulses: Normal pulses.      Heart sounds: Normal heart sounds. No murmur heard.  Pulmonary:      Effort: Pulmonary effort is normal. No respiratory distress.      Breath sounds: Normal breath sounds. No wheezing or rales.   Abdominal:      General: Abdomen is flat. Bowel sounds are normal. There is no distension.      Palpations: Abdomen is soft. There is no mass.      Tenderness: There is no abdominal tenderness. There is no guarding or rebound.   Musculoskeletal:      Cervical back: Neck supple.      Right lower leg: No edema.      Left lower leg: No edema.   Skin:     General: Skin is warm and " dry.   Neurological:      General: No focal deficit present.      Mental Status: He is alert.   Psychiatric:         Mood and Affect: Mood normal.         Thought Content: Thought content normal.            Assessment & Plan:   57 y.o. male with the following -    Patient here for a preventive medicine visit today and to establish care.  -Reviewed all past medical history, family history, social history    -Diet and exercise appropriate counseling given  -Social determinants of health reviewed  -Tobacco, alcohol, recreational drug use: no concerns  -Occupation: business owner    Immunizations  Immunizations: advised on shingles     Cancer screenings:  Colorectal cancer screening: ordered, advised follow up in 3 years due to adenomas (2019)       Problem List Items Addressed This Visit     Cystic fibrosis (HCC)     Cont management with [facility]. Has pulm, GI team for pancreas.            Relevant Orders    Comp Metabolic Panel    CBC WITHOUT DIFFERENTIAL    Exocrine pancreatic insufficiency     On enzymes, management with [facility].           History of colonic polyps - Primary     Referral to GI for colonoscopy           Relevant Orders    Referral to GI for Colonoscopy    Hypercholesteremia     Not on statin. Will reorder labs this year.            Palindromic rheumatism     Cont current regime, will refer to rheum to est care in this area as he has recently moved from [place]           Relevant Orders    Referral to Rheumatology    Comp Metabolic Panel    CBC WITHOUT DIFFERENTIAL    Sensorineural hearing loss, unilateral     Questionable due to CF?. Chronic condition, stable, managed by [facility] medical team.             Other Visit Diagnoses     Inflammatory polyarthritis (HCC)        Relevant Orders    Referral to Rheumatology           Return in about 6 months (around 12/15/2022) for labs, symptoms.    Please note that this dictation was created using voice recognition software. I have made every reasonable  attempt to correct obvious errors, but I expect that there are errors of grammar and possibly content that I did not discover before finalizing the note.

## 2022-10-08 NOTE — ASSESSMENT & PLAN NOTE
Khalif K  Patient notes recurrent issues with variable arthritis.   He states he will have flairs of various different joints  (usually one at a time), where an individual joint will  become large, swollen, warm, and tender....  He states he has been told it is related to his CF.   Has been using short-course of steroids, when it flairs.  Uses prednisone:     Rx for 20 mg for 2 days, then 10 mg for 2 days ....        But states he takes it as ...   30 mg d, for 1-2 days.   Currently not an issue (not active).   - listed for reference.    DO [place]   

## 2022-10-11 ENCOUNTER — OFFICE VISIT (OUTPATIENT)
Dept: MEDICAL GROUP | Facility: PHYSICIAN GROUP | Age: 57
End: 2022-10-11
Payer: COMMERCIAL

## 2022-10-11 VITALS
SYSTOLIC BLOOD PRESSURE: 120 MMHG | DIASTOLIC BLOOD PRESSURE: 78 MMHG | HEART RATE: 71 BPM | RESPIRATION RATE: 16 BRPM | TEMPERATURE: 97.9 F | OXYGEN SATURATION: 97 % | HEIGHT: 70 IN | WEIGHT: 187.61 LBS | BODY MASS INDEX: 26.86 KG/M2

## 2022-10-11 DIAGNOSIS — R22.0 HEAD LUMP: ICD-10-CM

## 2022-10-11 PROCEDURE — 99213 OFFICE O/P EST LOW 20 MIN: CPT | Performed by: STUDENT IN AN ORGANIZED HEALTH CARE EDUCATION/TRAINING PROGRAM

## 2022-10-11 RX ORDER — ALBUTEROL SULFATE 90 UG/1
2 AEROSOL, METERED RESPIRATORY (INHALATION)
COMMUNITY
Start: 2022-06-29

## 2022-10-11 RX ORDER — PANCRELIPASE 24000; 90750; 86250 [USP'U]/1; [USP'U]/1; [USP'U]/1
CAPSULE, DELAYED RELEASE ORAL
COMMUNITY
Start: 2022-09-02 | End: 2022-10-11

## 2022-10-11 ASSESSMENT — FIBROSIS 4 INDEX: FIB4 SCORE: 0.8

## 2022-10-12 PROBLEM — R22.0 HEAD LUMP: Status: ACTIVE | Noted: 2022-10-12

## 2022-10-12 NOTE — PROGRESS NOTES
HISTORY OF PRESENT ILLNESS: Gene is a pleasant 57 y.o. male, established patient who presents today to discuss medical problems as listed below:    Problem   Head Lump    Antonio is reporting a lump on the back of head on Left side that he feels is growing. He noticed this a couple months ago. No pain, tenderness, headache.           Current Outpatient Medications Ordered in Epic   Medication Sig Dispense Refill    albuterol 108 (90 Base) MCG/ACT Aero Soln inhalation aerosol Inhale 2 Puffs.      omeprazole (PRILOSEC) 40 MG delayed-release capsule Take 1 Capsule by mouth every day.      Oacabylq-Erpsglk-Xifagp&Ivacaf (TRIKAFTA) 100-50-75 & 150 MG Tablet Therapy Pack Take  by mouth.      folic acid (FOLVITE) 1 MG Tab Take 1 Tablet by mouth every day.      predniSONE (DELTASONE) 10 MG Tab 2 TABS DAILY FOR 2 DAYS THEN 1 DAILY FOR 2 DAYS AS NEEDED FOR ARTHRITIS FLARE INDICATIONS: INFLAMMATION OF MORE THAN ONE JOINT      polyethylene glycol 3350 (MIRALAX) 17 GM/SCOOP Powder Take 17 g by mouth every day.      Pancrelipase, Lip-Prot-Amyl, 14001-30566 units Cap DR Particles TAKE 8 CAPSULES 3 TIMES A  DAY WITH MEALS AND 4       CAPSULES 3 TIMES A DAY WITHSNACKS      naproxen (NAPROSYN) 500 MG Tab TAKE 1 TABLET DAILY AND TAKE A SECOND TABLET IF NEEDED FOR PAIN/INFLAMMATION OF MORE THAN 1 JOINT      methotrexate 2.5 MG Tab TAKE 8 TABLETS ONCE WEEKLY TAKE 4 TABLETS WITH BREAKFAST AND 4TABLETS WITH LUNCH      hydroxychloroquine (PLAQUENIL) 200 MG Tab Take 2 Tabs by mouth every day.      zolpidem (AMBIEN) 10 MG Tab Take 5-10 mg by mouth.       No current Epic-ordered facility-administered medications on file.       Review of systems:  Per HPI    Past Medical History:   Diagnosis Date    Arthritis     Cystic fibrosis (HCC)     Exocrine pancreatic insufficiency 11/23/2016    History of colonic polyps 07/29/2014    2 tubular adenomas - 2014, and again 2019. ... q3y     Hypercholesteremia 11/30/2018     History reviewed. No  pertinent surgical history.  Social History     Tobacco Use    Smoking status: Never    Smokeless tobacco: Never   Vaping Use    Vaping Use: Never used   Substance Use Topics    Alcohol use: Yes     Alcohol/week: 3.6 oz     Types: 4 Glasses of wine, 2 Standard drinks or equivalent per week     Comment: 6 drinks per week    Drug use: Never      Family History   Problem Relation Age of Onset    Ovarian Cancer Mother         Ovarian Cancer    Cancer Father         Esophageal cancer    Cystic Fibrosis Maternal Uncle     Lung Disease Maternal Uncle         Cystic Fibrosis     Current Outpatient Medications   Medication Sig Dispense Refill    albuterol 108 (90 Base) MCG/ACT Aero Soln inhalation aerosol Inhale 2 Puffs.      omeprazole (PRILOSEC) 40 MG delayed-release capsule Take 1 Capsule by mouth every day.      Rernuhos-Lkvvmat-Msglhd&Ivacaf (TRIKAFTA) 100-50-75 & 150 MG Tablet Therapy Pack Take  by mouth.      folic acid (FOLVITE) 1 MG Tab Take 1 Tablet by mouth every day.      predniSONE (DELTASONE) 10 MG Tab 2 TABS DAILY FOR 2 DAYS THEN 1 DAILY FOR 2 DAYS AS NEEDED FOR ARTHRITIS FLARE INDICATIONS: INFLAMMATION OF MORE THAN ONE JOINT      polyethylene glycol 3350 (MIRALAX) 17 GM/SCOOP Powder Take 17 g by mouth every day.      Pancrelipase, Lip-Prot-Amyl, 92902-06130 units Cap DR Particles TAKE 8 CAPSULES 3 TIMES A  DAY WITH MEALS AND 4       CAPSULES 3 TIMES A DAY WITHSNACKS      naproxen (NAPROSYN) 500 MG Tab TAKE 1 TABLET DAILY AND TAKE A SECOND TABLET IF NEEDED FOR PAIN/INFLAMMATION OF MORE THAN 1 JOINT      methotrexate 2.5 MG Tab TAKE 8 TABLETS ONCE WEEKLY TAKE 4 TABLETS WITH BREAKFAST AND 4TABLETS WITH LUNCH      hydroxychloroquine (PLAQUENIL) 200 MG Tab Take 2 Tabs by mouth every day.      zolpidem (AMBIEN) 10 MG Tab Take 5-10 mg by mouth.       No current facility-administered medications for this visit.       Allergies:  Allergies   Allergen Reactions    Sulfa Drugs Hives     rash       Allergies, past  "medical history, past surgical history, family history, social history reviewed and updated.    Objective:    /78   Pulse 71   Temp 36.6 °C (97.9 °F) (Temporal)   Resp 16   Ht 1.778 m (5' 10\")   Wt 85.1 kg (187 lb 9.8 oz)   SpO2 97%   BMI 26.92 kg/m²    Body mass index is 26.92 kg/m².    Physical exam:  General: Normal appearance, no acute distress, not ill-appearing  HEENT: EOM intact, conjunctiva normal limits, negative right/left eye discharge.  Sclera anicteric  Cardiovascular: Normal rate and rhythm, no murmurs  Pulmonary: No respiratory distress, no wheezing, no rales, breath sounds normal.  Abdomen: Bowel sounds normal, flat, soft.  Musculoskeletal: No edema bilaterally  Skin: L side of occiput small 1.5cm mobile rubbery nodule no erythema or tenderness  Neurological: No focal deficits, normal gait  Psychiatric: Mood within normal limits    Assessment/Plan:    Problem List Items Addressed This Visit       Head lump     Small mobile nodule on L side of occiput. No tenderness, erythema, or fluctuance.  Likely benign cyst    US ordered           Relevant Orders    US-SOFT TISSUES OF HEAD - NECK        Return in about 1 year (around 10/11/2023) for annual.   "

## 2022-10-12 NOTE — ASSESSMENT & PLAN NOTE
Small mobile nodule on L side of occiput. No tenderness, erythema, or fluctuance.  Likely benign cyst    US ordered     [address] Comprehensive

## 2022-10-14 ASSESSMENT — RHEUMATOLOGY NEW PATIENT QUESTIONNAIRE
NAUSEA: N
MUSCLE PAIN: N
BODY ACHES: N
HAIR SHEDDING: N
NECK PAIN: N
DIZZINESS: N
BLURRINESS: N
EYE PAIN: N
ALCOHOL TOBACCO OR RECREATIONAL DRUGS: ALCOHOL
SORE THROAT: N
SHORTNESS OF BREATH: N
BLOODY CONSTIPATION: N
NASAL ULCERS: N
CAVITIES: N
ABDOMINAL PAIN: N
VOMITING: N
DRY EYES: N
DRY MOUTH: N
SKIN THICKENING: N
JOINT SWELLING: Y
NOSEBLEEDS: N
BLOOD IN URINE: N
SPASMS: N
NIGHT SWEATS: N
COUGH WITH BLOODY PHLEGM: N
SINONASAL CONGESTION: N
ACHILLES TENDON PAIN: N
DIFFICULTY SWALLOWING: N
TINGLING: N
FROTHY URINE: N
EYE REDNESS: N
HEARING LOSS: N
DIFFICULTY SPEAKING: N
CHILLS: N
GENITAL ULCERS: N
EAR PAIN: N
FEVERS: N
VISION LOSS: N
THYROID PAIN: N
ANXIETY: N
CHEST PAIN WITH BREATHING: N
DEPRESSION: N
BLOODY DIARRHEA: N
BLEEDING GUMS: N
ABNORMAL DISCHARGE: N
HEARTBURN: N
NUMBNESS: N
JOINT PAIN: SAME WITH ACTIVITY
TEMPLE PAIN: N
PELVIC PAIN: N
RINGING IN EARS: N
MUSCLE WEAKNESS: N
VERTIGO: N
HEADACHES: N
IRREGULAR BEATS: N
COLD-INDUCED COLOR CHANGES (WHITE, PURPLE, RED ON REWARMING): FINGERS
BURNING URINATION: N
HAIR LOSS WITH BALD SPOTS: N
SNORING: N
ORAL ULCERS: N
PALPITATIONS: N
GOITER: N
MARK ALL THE AREAS OF PAIN: 78210887
DRY COUGH: N
RATE_1TO10: 10
SINUS PAIN: N
LIST CURRENT PROBLEMS: CYSTIC FIBROSIS
SKIN PLAQUES: N
CHIEF COMPLAINT: ARTHRITIS

## 2022-10-17 ENCOUNTER — OFFICE VISIT (OUTPATIENT)
Dept: RHEUMATOLOGY | Facility: MEDICAL CENTER | Age: 57
End: 2022-10-17
Attending: STUDENT IN AN ORGANIZED HEALTH CARE EDUCATION/TRAINING PROGRAM
Payer: COMMERCIAL

## 2022-10-17 VITALS
HEIGHT: 70 IN | DIASTOLIC BLOOD PRESSURE: 72 MMHG | WEIGHT: 182.4 LBS | RESPIRATION RATE: 16 BRPM | SYSTOLIC BLOOD PRESSURE: 104 MMHG | BODY MASS INDEX: 26.11 KG/M2 | HEART RATE: 84 BPM | OXYGEN SATURATION: 96 % | TEMPERATURE: 98.7 F

## 2022-10-17 DIAGNOSIS — Z79.899 LONG-TERM USE OF HYDROXYCHLOROQUINE: ICD-10-CM

## 2022-10-17 DIAGNOSIS — Z79.60 LONG-TERM USE OF IMMUNOSUPPRESSANT MEDICATION: ICD-10-CM

## 2022-10-17 DIAGNOSIS — M12.30 PALINDROMIC RHEUMATISM: ICD-10-CM

## 2022-10-17 PROCEDURE — 99204 OFFICE O/P NEW MOD 45 MIN: CPT | Performed by: STUDENT IN AN ORGANIZED HEALTH CARE EDUCATION/TRAINING PROGRAM

## 2022-10-17 PROCEDURE — 99212 OFFICE O/P EST SF 10 MIN: CPT | Performed by: STUDENT IN AN ORGANIZED HEALTH CARE EDUCATION/TRAINING PROGRAM

## 2022-10-17 ASSESSMENT — FIBROSIS 4 INDEX: FIB4 SCORE: 0.8

## 2022-10-17 NOTE — PROGRESS NOTES
[facility] RHEUMATOLOGY  [address] [place] [address], [address], [place] [address]  Phone: [phone] ? Fax: [phone]    RHEUMATOLOGY NEW PATIENT VISIT NOTE      DATE OF SERVICE: 10/17/2022    REFERRING PROVIDER:  Laurence Andrade D.O.  [address] [address]    PATIENT IDENTIFICATION:  Gene Tate  [address] [facility] [place] [address]    YOB: 1965    MEDICAL RECORD NUMBER: [id number]      Subjective        CHIEF COMPLAINT:   Chief Complaint   Patient presents with    New Patient     Palindromic rheumatism       HISTORY OF PRESENT ILLNESS:  Gene Tate is a 57 y.o. male with pertinent history notable for palindromic rheumatism diagnosed in 2015, and cystic fibrosis with exocrine pancreatic insufficiency among other comorbidities. Previously under the care of [facility] (last visit with Dr. Jean-Paul Quinn on 2/5/2021) in [place], he presents to establish care for continued evaluation and management of his condition. Reports episodic (at least once a month) joint pain in his hands, shoulders, and feet, with each episode lasting 2-3 days and resolving with naproxen and/or prednsiosne use. Notes that the frequency of the flares have decreased since starting on DMARDs, but he still gets flares typically once a month. Presently doing well with no symptoms and notes his medical and symptom history on the questionnaire below.    Pertinent treatment history as of 10/2022: Naproxen 500 mg PRN (first option during flares), prednisone 20 mg tapers (second option during flares), hydroxychloroquine 400 mg daily (2016-present), methotrexate 20 mg oral weekly (2017-present).    Pertinent lab results as of 9/2022: Negative JUNIE (in 2/2015), normal CBC, LFT and BMP.    [facility] New [facility] History Form    10/14/2022  4:24 PM PDT - Filed by Patient   Patient Information   Occupation:    Highest Level of Education: BS   Chief Complaint Arthritis   History of  Present Illness   Date of symptom onset (month/year): several years ago   Preceding incident/ailment: none   Describe/list your symptoms: sudden onset of stiffness, warming of the area, and then extreme pain   Aggravating factors: none   Alleviating factors:    Helpful medications: naproxen, then prednisone   Ineffective medications: none   Symptom severity/impact (scale of 1-10): 10 (during)   Personal/emotional stressors: none   [place] All The Locations Of Pain    REVIEW OF SYSTEMS    General   Fevers No   Chills No   Night sweats No   Unintentional Weight Loss None   Musculoskeletal   Joint pain Same with activity   Morning stiffness None   Joint swelling Yes   Achilles tendon pain No   Muscle pain No   Body Aches No   Dermatologic   Hair loss with bald spots No   Hair shedding No   Sunlight-induced skin rash    Skin thickening No   Skin plaques No   Cold-induced color changes (white, purple, red on rewarming) No   Neurologic/Psychiatric   Muscle weakness No   Spasms No   Tingling No   Numbness No   Anxiety No   Depression No   Head/Eyes   Headaches No   Temple pain No   Dizziness No   Dry eyes No   Eye pain No   Eye redness No   Blurriness No   Vision loss No   Ears/Nose   Ear pain No   Ringing in ears No   Vertigo No   Hearing loss No   Nasal ulcers No   Nosebleeds No   Sinus pain No   Sinonasal congestion No   Snoring No   Mouth/Throat   Oral ulcers No   Bleeding gums No   Dry mouth No   Cavities No   Sore throat No   Difficulty speaking No   Difficulty swallowing No   Neck/Lymphatics   Neck pain No   Thyroid pain No   Goiter No   Swollen Glands    Cardiac/Respiratory   Chest pain with breathing No   Dry cough No   Cough with bloody phlegm No   Shortness of breath No   Palpitations No   Irregular beats No   Gastrointestinal   Nausea No   Vomiting No   Heartburn No   Abdominal pain No   Bloody diarrhea No   Bloody constipation No   Genitourinary   Pelvic Pain No   Genital ulcers No   Abnormal discharge No    Burning urination No   Frothy urine No   Blood in urine No   Medical/Personal History Details   Current Medical Problems: Cystic Fibrosis   Past/Resolved Medical Problems:    Surgeries/Procedures (include month/year):    Prescription/Over-The-Counter/Herbal Medications:    Medication/Food/Material Allergies (include reactions):    Immunizations (include month/year)    Alcohol/Tobacco/Recreational Drugs: Alcohol   Family Medical History (only first-degree relatives):        ACTIVE PROBLEM LIST:  Patient Active Problem List   Diagnosis    Cystic fibrosis (HCC)    Exocrine pancreatic insufficiency    History of colonic polyps    Hypercholesteremia    Palindromic rheumatism    Internal hemorrhoids    Oligospermia    Sensorineural hearing loss, unilateral    Healthcare maintenance    Head lump    Long-term use of immunosuppressant medication    Long-term use of hydroxychloroquine   No problem-specific Assessment & Plan notes found for this encounter.      PAST MEDICAL HISTORY:  Past Medical History:   Diagnosis Date    Arthritis     Cystic fibrosis (HCC)     Exocrine pancreatic insufficiency 11/23/2016    History of colonic polyps 07/29/2014    2 tubular adenomas - 2014, and again 2019. ... q3y     Hypercholesteremia 11/30/2018       PAST SURGICAL HISTORY:  History reviewed. No pertinent surgical history.    MEDICATIONS:  Current Outpatient Medications   Medication Sig Dispense Refill    albuterol 108 (90 Base) MCG/ACT Aero Soln inhalation aerosol Inhale 2 Puffs.      omeprazole (PRILOSEC) 40 MG delayed-release capsule Take 1 Capsule by mouth every day.      Nowsqqwi-Peydwvk-Uopzfj&Ivacaf (TRIKAFTA) 100-50-75 & 150 MG Tablet Therapy Pack Take  by mouth.      folic acid (FOLVITE) 1 MG Tab Take 1 Tablet by mouth every day.      predniSONE (DELTASONE) 10 MG Tab 2 TABS DAILY FOR 2 DAYS THEN 1 DAILY FOR 2 DAYS AS NEEDED FOR ARTHRITIS FLARE INDICATIONS: INFLAMMATION OF MORE THAN ONE JOINT      polyethylene glycol 3350  (MIRALAX) 17 GM/SCOOP Powder Take 17 g by mouth every day.      Pancrelipase, Lip-Prot-Amyl, 50964-44747 units Cap DR Particles TAKE 8 CAPSULES 3 TIMES A  DAY WITH MEALS AND 4       CAPSULES 3 TIMES A DAY WITHSNACKS      naproxen (NAPROSYN) 500 MG Tab TAKE 1 TABLET DAILY AND TAKE A SECOND TABLET IF NEEDED FOR PAIN/INFLAMMATION OF MORE THAN 1 JOINT      methotrexate 2.5 MG Tab TAKE 8 TABLETS ONCE WEEKLY TAKE 4 TABLETS WITH BREAKFAST AND 4TABLETS WITH LUNCH      hydroxychloroquine (PLAQUENIL) 200 MG Tab Take 2 Tabs by mouth every day.      zolpidem (AMBIEN) 10 MG Tab Take 5-10 mg by mouth.       No current facility-administered medications for this visit.       ALLERGIES:   Allergies   Allergen Reactions    Sulfa Drugs Hives     rash       IMMUNIZATIONS:  Immunization History   Administered Date(s) Administered    Hepatitis A Vaccine, Adult 05/17/2002, 11/26/2002    Hepatitis B Vaccine (Adol/Adult) 11/26/2002, 10/31/2006, 05/11/2007    Influenza (IM) Preservative Free - HISTORICAL DATA 11/21/2014, 09/20/2017, 10/11/2018, 10/31/2019    Influenza Seasonal Injectable - Historical Data 10/13/2015    Influenza Vac Subunit Quad Inj (Pf) 10/30/2021    Influenza Vaccine Adult HD 03/19/2010, 09/15/2022    Influenza Vaccine Quad Inj (Pf) 01/16/2014, 10/13/2015, 10/04/2016, 09/20/2017, 10/11/2018, 10/31/2019, 12/20/2020, 12/20/2020    Influenza, Unspecified - HISTORICAL DATA 11/21/2014, 10/13/2015, 10/04/2016    PFIZER PURPLE CAP SARS-COV-2 VACCINATION (12+) 03/30/2021, 04/20/2021, 10/30/2021, 09/15/2022    Pneumococcal polysaccharide vaccine (PPSV-23) 08/07/2015    Tdap Vaccine 02/29/2008, 03/06/2018    Typhoid Vaccine 05/14/2008    dT Vaccine - HISTORICAL DATA 01/13/1999       SOCIAL HISTORY:   Social History     Tobacco Use    Smoking status: Never    Smokeless tobacco: Never   Vaping Use    Vaping Use: Never used   Substance Use Topics    Alcohol use: Yes     Alcohol/week: 3.6 oz     Types: 4 Glasses of wine, 2 Standard  "drinks or equivalent per week     Comment: 6 drinks per week    Drug use: Never       FAMILY HISTORY:  Family History   Problem Relation Age of Onset    Ovarian Cancer Mother         Ovarian Cancer    Cancer Father         Esophageal cancer    Cystic Fibrosis Maternal Uncle     Lung Disease Maternal Uncle         Cystic Fibrosis       Objective        Vital Signs: /72 (BP Location: Left arm, Patient Position: Sitting, BP Cuff Size: Adult)   Pulse 84   Temp 37.1 °C (98.7 °F) (Temporal)   Resp 16   Ht 1.778 m (5' 10\")   Wt 82.7 kg (182 lb 6.4 oz)   SpO2 96% Body mass index is 26.17 kg/m².    General: Appears well and comfortable  Eyes: No scleral or conjunctival lesions  ENT: No apparent oral or nasal lesions  Head/Neck: No apparent scalp or neck lesions  Cardiovascular: Regular rate and rhythm; no pericardial rubs  Respiratory: Breathing quiet and unlabored; no rales or pleural rubs  Gastrointestinal: No organomegaly or abdominal masses  Integumentary: No significant cutaneous lesions or discolorations  Musculoskeletal: No significant joint tenderness, periarticular soft tissue swelling, warmth, erythema, or overt signs of synovitis; no significant restriction in range of motion of joints examined  Neurologic: No focal sensory or motor deficits  Psychiatric: Mood and affect appropriate      LABORATORY RESULTS REVIEWED AND INTERPRETED BY ME:  Lab Results   Component Value Date/Time    ASTSGOT 19 06/25/2021 07:21 AM    ALTSGPT 23 06/25/2021 07:21 AM    ALKPHOSPHAT 79 06/25/2021 07:21 AM    TBILIRUBIN 0.5 06/25/2021 07:21 AM    TOTPROTEIN 6.9 06/25/2021 07:21 AM    ALBUMIN 4.4 06/25/2021 07:21 AM     Lab Results   Component Value Date/Time    SODIUM 139 06/25/2021 07:21 AM    POTASSIUM 5.3 06/25/2021 07:21 AM    CHLORIDE 104 06/25/2021 07:21 AM    CO2 24 06/25/2021 07:21 AM    GLUCOSE 121 (H) 06/25/2021 07:21 AM    BUN 23 (H) 06/25/2021 07:21 AM    CREATININE 0.78 01/28/2022 03:12 PM    CREATININE 0.83 " 06/25/2021 07:21 AM    CALCIUM 9.5 06/25/2021 07:21 AM     Lab Results   Component Value Date/Time    WBC 6.4 09/01/2022 11:09 AM    WBC 6.1 06/25/2021 07:21 AM    RBC 5.66 06/25/2021 07:21 AM    HEMOGLOBIN 16.1 09/01/2022 11:09 AM    HEMOGLOBIN 17.1 06/25/2021 07:21 AM    HEMATOCRIT 46.9 09/01/2022 11:09 AM    HEMATOCRIT 52.4 (H) 06/25/2021 07:21 AM    MCV 89 09/01/2022 11:09 AM    MCV 92.6 06/25/2021 07:21 AM    MCH 30.5 09/01/2022 11:09 AM    MCH 30.2 06/25/2021 07:21 AM    MCHC 34.3 09/01/2022 11:09 AM    MCHC 32.6 (L) 06/25/2021 07:21 AM    RDW 14.6 09/01/2022 11:09 AM    RDW 53.0 (H) 06/25/2021 07:21 AM    PLATELETCT 288 09/01/2022 11:09 AM    PLATELETCT 301 06/25/2021 07:21 AM    MPV 9.2 06/25/2021 07:21 AM    NEUTS 5.13 06/25/2021 07:21 AM    LYMPHOCYTES 11.20 (L) 06/25/2021 07:21 AM    MONOCYTES 3.10 06/25/2021 07:21 AM    EOSINOPHILS 0.00 06/25/2021 07:21 AM    BASOPHILS 0.30 06/25/2021 07:21 AM     Lab Results   Component Value Date/Time    CHOLSTRLTOT 235 (H) 06/25/2021 07:21 AM     (H) 06/25/2021 07:21 AM    HDL 69 06/25/2021 07:21 AM    TRIGLYCERIDE 44 06/25/2021 07:21 AM    HBA1C 5.6 01/26/2022 07:32 AM       RADIOLOGY RESULTS REVIEWED AND INTERPRETED BY ME:  Results for orders placed in visit on 01/06/21    DX-KNEE COMPLETE 4+ RIGHT    Impression  No fracture.    This exam was performed in an orthopedic clinic of the [facility].  This interpretation was performed in addition to the interpretation by the ordering provider.      All relevant laboratory and imaging results reported on this note were reviewed and interpreted by me.      Assessment & Plan        Gene Tate is a 57 y.o. male with history as noted above whose presentation merits the following diagnostic and clinical status impressions and recommendations:    1. Palindromic rheumatism  Presently with low clinical disease activity, but his reported monthly flares suggest incomplete control on his current regimen of  hydroxychloroquine and methotrexate such that he is still not in remission. Given the potential for evolving into rheumatoid arthritis, need to ascertain his current autoantibody profile and inflammatory status for complete assessment based on which additional recommendations may be provided.  - RHEUMATOID ARTHRITIS FACTOR; Future  - CCP ANTIBODY; Future  - Sed Rate; Future  - CRP QUANTITIVE (NON-CARDIAC); Future  - Continue hydroxychloroquine 400 mg daily  - Continue methotrexate 20 mg oral weekly with folic acid 1 mg daily    2. Long-term use of immunosuppressant medication  No present history, physical findings, or laboratory evidence to suggest significant adverse drug effects or opportunistic infections.  - Up to date on most age-appropriate vaccines    3. Long-term use of hydroxychloroquine  Risk of retinal toxicity is considered minimal in this case given the low dose of hydroxychloroquine prescribed (less than 5 mg/kg of body weight) per rheumatology/ophthalmology guidelines. However, ophthalmologic evaluation is still recommended with the frequency of routine follow-up eye exams determined by the ophthalmologist, typically annually or every other year.  - Routine ophthalmology evaluation as recommended      FOLLOW-UP: Return in about 4 months (around 2/17/2023) for Short.           Thank you for the opportunity to participate in the care of Gene Tate.    Bernardo Cardoso MD, MS  Rheumatologist, [facility] ? [facility]   of Clinical Medicine, Department of Internal Medicine  [facility] ? [facility]

## 2022-10-17 NOTE — PATIENT INSTRUCTIONS
AFTER VISIT INSTRUCTIONS    Below are important information to help you navigate your healthcare needs and help us serve you safely and effectively:  If laboratory tests and/or imaging studies were ordered, remember to go get them done.  If new prescriptions or refills were sent to the pharmacy, remember to go pick them up.  Take your medications exactly as prescribed unless instructed otherwise.  If there are significant findings on your lab tests and imaging studies that warrant further action, I will notify you with explanations via [employer] or phone call, otherwise you can view them on "[email or web address]t and let me know if you have any questions.  Sign up for [employer] if you have not already done so, in order to have access to the results of your lab tests and imaging studies, and to be able to send and receive messages from me.  Note that [employer] messages are typically read during office hours only and may take 1-7 days before a response depending on the urgency of the situation and how busy my schedule is.  In general, [employer] messaging is for non-urgent matters that do not require immediate attention, so for urgent matters that cannot wait, you are advised to go to an urgent care.

## 2022-12-20 ENCOUNTER — HOSPITAL ENCOUNTER (EMERGENCY)
Facility: MEDICAL CENTER | Age: 57
End: 2022-12-20
Attending: EMERGENCY MEDICINE
Payer: COMMERCIAL

## 2022-12-20 ENCOUNTER — APPOINTMENT (OUTPATIENT)
Dept: RADIOLOGY | Facility: MEDICAL CENTER | Age: 57
End: 2022-12-20
Payer: COMMERCIAL

## 2022-12-20 VITALS
OXYGEN SATURATION: 94 % | BODY MASS INDEX: 26.54 KG/M2 | DIASTOLIC BLOOD PRESSURE: 74 MMHG | SYSTOLIC BLOOD PRESSURE: 149 MMHG | WEIGHT: 185.41 LBS | RESPIRATION RATE: 18 BRPM | TEMPERATURE: 98.5 F | HEIGHT: 70 IN | HEART RATE: 78 BPM

## 2022-12-20 DIAGNOSIS — U07.1 COVID: ICD-10-CM

## 2022-12-20 LAB
ALBUMIN SERPL BCP-MCNC: 4.1 G/DL (ref 3.2–4.9)
ALBUMIN/GLOB SERPL: 1.7 G/DL
ALP SERPL-CCNC: 76 U/L (ref 30–99)
ALT SERPL-CCNC: 23 U/L (ref 2–50)
ANION GAP SERPL CALC-SCNC: 15 MMOL/L (ref 7–16)
AST SERPL-CCNC: 17 U/L (ref 12–45)
BASOPHILS # BLD AUTO: 0.5 % (ref 0–1.8)
BASOPHILS # BLD: 0.05 K/UL (ref 0–0.12)
BILIRUB SERPL-MCNC: 0.6 MG/DL (ref 0.1–1.5)
BLOOD CULTURE HOLD CXBCH: NORMAL
BUN SERPL-MCNC: 24 MG/DL (ref 8–22)
CALCIUM ALBUM COR SERPL-MCNC: 9.2 MG/DL (ref 8.5–10.5)
CALCIUM SERPL-MCNC: 9.3 MG/DL (ref 8.5–10.5)
CHLORIDE SERPL-SCNC: 104 MMOL/L (ref 96–112)
CO2 SERPL-SCNC: 22 MMOL/L (ref 20–33)
CREAT SERPL-MCNC: 0.96 MG/DL (ref 0.5–1.4)
EOSINOPHIL # BLD AUTO: 0.09 K/UL (ref 0–0.51)
EOSINOPHIL NFR BLD: 1 % (ref 0–6.9)
ERYTHROCYTE [DISTWIDTH] IN BLOOD BY AUTOMATED COUNT: 50.2 FL (ref 35.9–50)
FLUAV RNA SPEC QL NAA+PROBE: NEGATIVE
FLUBV RNA SPEC QL NAA+PROBE: NEGATIVE
GFR SERPLBLD CREATININE-BSD FMLA CKD-EPI: 91 ML/MIN/1.73 M 2
GLOBULIN SER CALC-MCNC: 2.4 G/DL (ref 1.9–3.5)
GLUCOSE SERPL-MCNC: 90 MG/DL (ref 65–99)
HCT VFR BLD AUTO: 45.5 % (ref 42–52)
HGB BLD-MCNC: 15.7 G/DL (ref 14–18)
IMM GRANULOCYTES # BLD AUTO: 0.06 K/UL (ref 0–0.11)
IMM GRANULOCYTES NFR BLD AUTO: 0.6 % (ref 0–0.9)
LYMPHOCYTES # BLD AUTO: 0.97 K/UL (ref 1–4.8)
LYMPHOCYTES NFR BLD: 10.3 % (ref 22–41)
MCH RBC QN AUTO: 30.3 PG (ref 27–33)
MCHC RBC AUTO-ENTMCNC: 34.5 G/DL (ref 33.7–35.3)
MCV RBC AUTO: 87.8 FL (ref 81.4–97.8)
MONOCYTES # BLD AUTO: 0.72 K/UL (ref 0–0.85)
MONOCYTES NFR BLD AUTO: 7.7 % (ref 0–13.4)
NEUTROPHILS # BLD AUTO: 7.49 K/UL (ref 1.82–7.42)
NEUTROPHILS NFR BLD: 79.9 % (ref 44–72)
NRBC # BLD AUTO: 0 K/UL
NRBC BLD-RTO: 0 /100 WBC
PLATELET # BLD AUTO: 220 K/UL (ref 164–446)
PMV BLD AUTO: 8.8 FL (ref 9–12.9)
POTASSIUM SERPL-SCNC: 3.9 MMOL/L (ref 3.6–5.5)
PROT SERPL-MCNC: 6.5 G/DL (ref 6–8.2)
RBC # BLD AUTO: 5.18 M/UL (ref 4.7–6.1)
RSV RNA SPEC QL NAA+PROBE: NEGATIVE
SARS-COV-2 RNA RESP QL NAA+PROBE: DETECTED
SODIUM SERPL-SCNC: 141 MMOL/L (ref 135–145)
SPECIMEN SOURCE: ABNORMAL
WBC # BLD AUTO: 9.4 K/UL (ref 4.8–10.8)

## 2022-12-20 PROCEDURE — A9270 NON-COVERED ITEM OR SERVICE: HCPCS | Performed by: EMERGENCY MEDICINE

## 2022-12-20 PROCEDURE — 71045 X-RAY EXAM CHEST 1 VIEW: CPT

## 2022-12-20 PROCEDURE — C9803 HOPD COVID-19 SPEC COLLECT: HCPCS | Performed by: EMERGENCY MEDICINE

## 2022-12-20 PROCEDURE — 0241U HCHG SARS-COV-2 COVID-19 NFCT DS RESP RNA 4 TRGT MIC: CPT

## 2022-12-20 PROCEDURE — 93005 ELECTROCARDIOGRAM TRACING: CPT | Performed by: EMERGENCY MEDICINE

## 2022-12-20 PROCEDURE — 700102 HCHG RX REV CODE 250 W/ 637 OVERRIDE(OP): Performed by: EMERGENCY MEDICINE

## 2022-12-20 PROCEDURE — 85025 COMPLETE CBC W/AUTO DIFF WBC: CPT

## 2022-12-20 PROCEDURE — 93005 ELECTROCARDIOGRAM TRACING: CPT

## 2022-12-20 PROCEDURE — 36415 COLL VENOUS BLD VENIPUNCTURE: CPT

## 2022-12-20 PROCEDURE — 80053 COMPREHEN METABOLIC PANEL: CPT

## 2022-12-20 PROCEDURE — 99284 EMERGENCY DEPT VISIT MOD MDM: CPT

## 2022-12-20 RX ORDER — ALBUTEROL SULFATE 90 UG/1
2 AEROSOL, METERED RESPIRATORY (INHALATION) ONCE
Status: COMPLETED | OUTPATIENT
Start: 2022-12-20 | End: 2022-12-20

## 2022-12-20 RX ADMIN — ALBUTEROL SULFATE 2 PUFF: 90 AEROSOL, METERED RESPIRATORY (INHALATION) at 20:16

## 2022-12-20 ASSESSMENT — FIBROSIS 4 INDEX: FIB4 SCORE: 0.8

## 2022-12-20 NOTE — ED TRIAGE NOTES
"Chief Complaint   Patient presents with    Sent by MD Reina of cystic fibrosis. Tested positive for covid today. Recently finished Linezoid and today woke with chest \"pressure/tightness\" and yellow/green sputum. + cough.     BP (!) 174/92   Pulse (!) 101   Temp 36.6 °C (97.9 °F) (Temporal)   Resp 16   Ht 1.778 m (5' 10\")   Wt 84.1 kg (185 lb 6.5 oz)   SpO2 95%   BMI 26.60 kg/m²     Pt ambulated into triage, mask in place. NAD, encouraged to return to the triage nurse or tech with any new complaints or symptoms.  "

## 2022-12-21 LAB — EKG IMPRESSION: NORMAL

## 2022-12-21 NOTE — ED NOTES
Patient educated on the use of a spacer with inhaler and demonstrated understanding.   Patient resting comfortably, resp even and unlabored, NAD, and no needs at this time.

## 2022-12-21 NOTE — ED NOTES
PIV removed, catheter intact. Discharge education provided. Discharge paperwork signed by pt.  All questions answered. All belongings with pt. Pt ambulated to lobby unassisted with steady gait.

## 2022-12-21 NOTE — DISCHARGE INSTRUCTIONS
You are seen in the Emergency Department for cough, positive COVID test.  Your chest x-ray and labs were reassuring.  Your case was discussed with the cystic fibrosis team at [facility], who agree that you are safe for discharge home taking Paxlovid.    Please follow closely with your regular doctor and remain in touch with your cystic fibrosis team.    These return to the emergency department see medical attention if you develop:  Difficulties breathing, oxygen saturation below 90%, any other new or concerning findings

## 2022-12-21 NOTE — ED PROVIDER NOTES
"ED Provider Note    Scribed for Damian Cuba M.D. by Nasim Molina. 12/20/2022,  7:53 PM.    Means of Arrival: Walk in  History obtained from: Patient  History limited by: None     CHIEF COMPLAINT  Chief Complaint   Patient presents with    Sent by MD Reina of cystic fibrosis. Tested positive for covid today. Recently finished Linezoid and today woke with chest \"pressure/tightness\" and yellow/green sputum. + cough.       HPI  Gene Tate is a 57 y.o. male with a history of cystic fibrosis who presents to the Emergency Department for evaluation of shortness of breath onset last night. Patient states he had a chest infection over the past couple of weeks and started feeling better but started getting worse again over the past few days ago. When he called his pulmonologist (Dr. Bronw at [facility]) he was recommended to present here for further evaluation of treatment. Patient reports associated cough, sputum production, and chest pressure but denies any fever or hemoptysis. He notes taking methotrexate regularly and prednisone as needed. Patient states he recently tested positive for COVID and on the way to the ER today, he picked up Paxlovid. He has been taking albuterol with minimal alleviation to symptoms. Patient denies using a spacer.    REVIEW OF SYSTEMS  CONSTITUTIONAL:  No fever.  CARDIOVASCULAR:  +Chest tightness  RESPIRATORY:  +Shortness of breath, cough, and sputum production. No hemoptysis  See HPI for further details.   All other systems are negative.     PAST MEDICAL HISTORY  Past Medical History:   Diagnosis Date    Arthritis     Cystic fibrosis (HCC)     Exocrine pancreatic insufficiency 11/23/2016    History of colonic polyps 07/29/2014    2 tubular adenomas - 2014, and again 2019. ... q3y     Hypercholesteremia 11/30/2018       FAMILY HISTORY  Family History   Problem Relation Age of Onset    Ovarian Cancer Mother         Ovarian Cancer    Cancer Father         Esophageal cancer    Cystic " "Fibrosis Maternal Uncle     Lung Disease Maternal Uncle         Cystic Fibrosis       SOCIAL HISTORY   reports that he has never smoked. He has never used smokeless tobacco. He reports current alcohol use of about 3.6 oz per week. He reports that he does not use drugs.    SURGICAL HISTORY  History reviewed. No pertinent surgical history.    CURRENT MEDICATIONS  Home Medications       Reviewed by Galilea Dorsey R.N. (Registered Nurse) on 12/20/22 at 1453  Med List Status: Not Addressed     Medication Last Dose Status   albuterol 108 (90 Base) MCG/ACT Aero Soln inhalation aerosol  Active   Jqzaugoq-Immjasx-Rcigea&Ivacaf (TRIKAFTA) 100-50-75 & 150 MG Tablet Therapy Pack  Active   folic acid (FOLVITE) 1 MG Tab  Active   hydroxychloroquine (PLAQUENIL) 200 MG Tab  Active   methotrexate 2.5 MG Tab  Active   naproxen (NAPROSYN) 500 MG Tab  Active   omeprazole (PRILOSEC) 40 MG delayed-release capsule  Active   Pancrelipase, Lip-Prot-Amyl, 16232-30034 units Cap DR Particles  Active   polyethylene glycol 3350 (MIRALAX) 17 GM/SCOOP Powder  Active   predniSONE (DELTASONE) 10 MG Tab  Active   zolpidem (AMBIEN) 10 MG Tab  Active                    ALLERGIES  Allergies   Allergen Reactions    Sulfa Drugs Hives     rash       PHYSICAL EXAM  VITAL SIGNS: BP (!) 148/87   Pulse 87   Temp 36.9 °C (98.5 °F) (Temporal)   Resp 19   Ht 1.778 m (5' 10\")   Wt 84.1 kg (185 lb 6.5 oz)   SpO2 94%   BMI 26.60 kg/m²    Gen: Alert, no acute distress  HEENT: ATNC  Eyes: PERRL, EOMI, normal conjunctiva  Neck: trachea midline  Resp: Limited air exchange but without crackles or wheezes. Frequent dry cough. no respiratory distress  CV: RRR, no murmur. No JVD  Abd: non-distended  Ext: No pedal edema or calf tenderness. No deformities  Psych: normal mood  Neuro: speech fluent       DIAGNOSTIC STUDIES / PROCEDURES     EKG  Results for orders placed or performed during the hospital encounter of 12/20/22   EKG   Result Value Ref Range    Report   "     [facility] Emergency Dept.    Test Date:  2022  Pt Name:    ARLETTE JONES             Department: ER  MRN:        [id number]                      Room:        25  Gender:     Male                         Technician: [id number]  :        1965                   Requested By:ER TRIAGE PROTOCOL  Order #:    [id number]                    Reading MD: Damian Cuba    Measurements  Intervals                                Axis  Rate:       84                           P:          35  MI:         165                          QRS:        33  QRSD:       93                           T:          55  QT:         363  QTc:        430    Interpretive Statements  Sinus rhythm  Abnormal R-wave progression, early transition  Consider left ventricular hypertrophy  No previous ECG available for comparison  Electronically Signed On 2022 1:03:15 PST by Damian Cuba          LABS  Labs Reviewed   CBC WITH DIFFERENTIAL - Abnormal; Notable for the following components:       Result Value    RDW 50.2 (*)     MPV 8.8 (*)     Neutrophils-Polys 79.90 (*)     Lymphocytes 10.30 (*)     Neutrophils (Absolute) 7.49 (*)     Lymphs (Absolute) 0.97 (*)     All other components within normal limits   COMP METABOLIC PANEL - Abnormal; Notable for the following components:    Bun 24 (*)     All other components within normal limits   COV-2, FLU A/B, AND RSV BY PCR (Munetrix) - Abnormal; Notable for the following components:    SARS-CoV-2 by PCR DETECTED (*)     All other components within normal limits   BLOOD CULTURE,HOLD   CORRECTED CALCIUM   ESTIMATED GFR     All labs reviewed by me.    RADIOLOGY  DX-CHEST-PORTABLE (1 VIEW)   Final Result      No acute cardiopulmonary abnormality.           The radiologist’s interpretation of all radiology studies have been reviewed by me.    COURSE & MEDICAL DECISION MAKING  Pertinent Labs & Imaging studies reviewed. (See chart for details)    7:53 PM Patient seen and examined at  bedside. Patient will be treated with albuterol.    9:07 PM - I discussed the patient's case and the above findings with Dr. Escoto (Pulmonary) who said the patient is safe for discharge with his prescribed medications if he presents well here.     9:18 PM - Patient was reevaluated at bedside. Informed him of lab and radiology findings. Had a discussion about plans for discharge and return precautions. Patient verbalizes understanding and agreement to this plan of care.  Patient given opportunity to ask questions.    Medical Decision Making:  Patient with history of cystic fibrosis with recent treatment of linezolid for MRSA presents with cough, COVID positive.  He reports a decrease of his FEV1 at home.  He is saturating well in the emergency department.  Chest x-ray demonstrates no pneumonia, auscultation demonstrates no wheezes or crackles.  He did feel improved after albuterol.  Labs reassuring, no evidence of significant leukocytosis.  The lab analyzer for metabolic panel is not functioning at this time, will discharge home and contact if this result something markedly abnormal.  No stigmata of DVT/PE.    The case was discussed with Dr. Escoto, pulmonology at [place], who agree that the patient is safe for discharge home.  He already has Paxlovid.    The patient will return for new or worsening symptoms and is stable at the time of discharge.    The patient is referred to a primary physician for blood pressure management, diabetic screening, and for all other preventative health concerns.    DISPOSITION:  Patient will be discharged home in stable condition.    FOLLOW UP:  Laurence Andrade D.O.  [address] [id number] [phone]    Schedule an appointment as soon as possible for a visit       [facility], Emergency Dept  [address] [facility] [address] [phone]    If symptoms worsen    FINAL IMPRESSION  1. Nasim ALARCON  (Scribe), am scribing for, and in the presence of, Damian Cuba M.D..    Electronically signed by: Nasim Molina (Arabellaibsandra), 12/20/2022    IDamian M.D. personally performed the services described in this documentation, as scribed by Nasim Molina in my presence, and it is both accurate and complete.    The note accurately reflects work and decisions made by me.  Damian Cuba M.D.  12/21/2022  1:04 AM      This dictation was created using voice recognition software. The accuracy of the dictation is limited to the abilities of the software. I expect there may be some errors of grammar and possibly content. The nursing notes were reviewed and certain aspects of this information were incorporated into this note.

## 2023-02-15 ASSESSMENT — RHEUMATOLOGY FOLLOW-UP QUESTIONNAIRE
MUSCLE PAIN: N
DIFFICULTY SPEAKING: N
ACHILLES TENDON PAIN: N
VOMITING: N
BLOODY CONSTIPATION: N
SHORTNESS OF BREATH: N
SORE THROAT: N
SPASMS: N
IRREGULAR BEATS: N
COUGH WITH BLOODY PHLEGM: N
MARK ALL THE AREAS OF PAIN: 82492481
PELVIC PAIN: N
DEPRESSION: N
HAIR SHEDDING: N
JOINT PAIN: SAME WITH ACTIVITY
HEARTBURN: N
CHIEF COMPLAINT: ARTHRITIS
ABNORMAL DISCHARGE: N
BURNING URINATION: N
DIZZINESS: N
THYROID PAIN: N
GOITER: N
JOINT SWELLING: Y
DRY MOUTH: N
ORAL ULCERS: N
CAVITIES: N
TEMPLE PAIN: N
EYE PAIN: N
TINGLING: N
GENITAL ULCERS: N
MUSCLE WEAKNESS: N
BLURRINESS: N
FROTHY URINE: N
DRY COUGH: N
HEADACHES: N
VISION LOSS: N
EYE REDNESS: N
SKIN PLAQUES: N
CHEST PAIN WITH BREATHING: N
HAIR LOSS WITH BALD SPOTS: N
FEVERS: N
DRY EYES: N
BLOOD IN URINE: N
NUMBNESS: N
ANXIETY: N
SKIN THICKENING: N
BLOODY DIARRHEA: N
PALPITATIONS: N
BLEEDING GUMS: N
CHILLS: N
DIFFICULTY SWALLOWING: N
ABDOMINAL PAIN: N
NIGHT SWEATS: N
NAUSEA: N
NECK PAIN: N

## 2023-02-16 ENCOUNTER — OFFICE VISIT (OUTPATIENT)
Dept: RHEUMATOLOGY | Facility: MEDICAL CENTER | Age: 58
End: 2023-02-16
Attending: STUDENT IN AN ORGANIZED HEALTH CARE EDUCATION/TRAINING PROGRAM
Payer: COMMERCIAL

## 2023-02-16 VITALS
OXYGEN SATURATION: 93 % | SYSTOLIC BLOOD PRESSURE: 116 MMHG | TEMPERATURE: 97.7 F | HEIGHT: 70 IN | HEART RATE: 77 BPM | DIASTOLIC BLOOD PRESSURE: 74 MMHG | BODY MASS INDEX: 26.4 KG/M2 | WEIGHT: 184.38 LBS

## 2023-02-16 DIAGNOSIS — D84.9 IMMUNOSUPPRESSED STATUS (HCC): ICD-10-CM

## 2023-02-16 DIAGNOSIS — Z79.899 LONG-TERM USE OF HYDROXYCHLOROQUINE: ICD-10-CM

## 2023-02-16 DIAGNOSIS — M12.30 PALINDROMIC RHEUMATISM: ICD-10-CM

## 2023-02-16 PROCEDURE — 99214 OFFICE O/P EST MOD 30 MIN: CPT | Performed by: STUDENT IN AN ORGANIZED HEALTH CARE EDUCATION/TRAINING PROGRAM

## 2023-02-16 PROCEDURE — 99212 OFFICE O/P EST SF 10 MIN: CPT | Performed by: STUDENT IN AN ORGANIZED HEALTH CARE EDUCATION/TRAINING PROGRAM

## 2023-02-16 RX ORDER — SOD CHLOR,BICARB/SQUEEZ BOTTLE
1 PACKET, WITH RINSE DEVICE NASAL
COMMUNITY
Start: 2022-12-01 | End: 2023-02-16

## 2023-02-16 RX ORDER — LINEZOLID 600 MG/1
TABLET, FILM COATED ORAL
COMMUNITY
Start: 2022-12-06 | End: 2023-02-16

## 2023-02-16 RX ORDER — LEVOFLOXACIN 750 MG/1
TABLET, FILM COATED ORAL
COMMUNITY
Start: 2022-12-01 | End: 2023-02-16

## 2023-02-16 RX ORDER — POLYETHYLENE GLYCOL-3350 AND ELECTROLYTES 236; 6.74; 5.86; 2.97; 22.74 G/274.31G; G/274.31G; G/274.31G; G/274.31G; G/274.31G
POWDER, FOR SOLUTION ORAL
COMMUNITY
Start: 2023-01-03 | End: 2023-02-16

## 2023-02-16 RX ORDER — NIRMATRELVIR AND RITONAVIR 300-100 MG
KIT ORAL
COMMUNITY
Start: 2022-12-20 | End: 2023-02-16

## 2023-02-16 ASSESSMENT — FIBROSIS 4 INDEX: FIB4 SCORE: 0.48

## 2023-02-16 NOTE — PROGRESS NOTES
[address], [address], [place] [address]  Phone: [phone] ? Fax: [phone]    RHEUMATOLOGY FOLLOW-UP VISIT NOTE      DATE OF SERVICE: 02/16/2023         Subjective     PRIMARY CARE PRACTITIONER:  Laurence Andrade D.O.  [address] [facility] [address]  [facility] [id number]    PATIENT IDENTIFICATION:  Gene Tate  [address] [facility] [address]    YOB: 1965    MEDICAL RECORD NUMBER: [id number]          CHIEF COMPLAINT:   Chief Complaint   Patient presents with    Follow-Up     Palindromic rheumatism       RHEUMATOLOGIC HISTORY:  Gene Tate is a 57 y.o. male with pertinent history notable for palindromic rheumatism diagnosed in 2015, and cystic fibrosis with exocrine pancreatic insufficiency among other comorbidities. Previously under the care of [facility] (last visit with Dr. Jean-Paul Quinn on 2/5/2021) in [place], he initially presented on 10/17/22 to establish care for continued evaluation and management of his condition. Reported episodic (at least once a month) joint pain (with no morning stiffness) in his hands, shoulders, and feet, with each episode lasting 2-3 days and resolving with naproxen and/or prednsiosne use. Noted that the frequency of the flares had decreased since starting on DMARDs, but he still experienced occasional flares typically once a month.     Pertinent treatment history: Naproxen 500 mg PRN (first option during flares), prednisone 20 mg tapers (second option during flares), hydroxychloroquine 400 mg daily (2016-present), methotrexate 20 mg oral weekly (2017-present).     Pertinent lab results history: Negative JUNIE (in 2/2015), RF, anti-CCP, ESR and CRP (in 11/2022), eGFR, creatinine, LFTs, and unremarkable CBC (1/2023).    Pertinent XR imaging between 9/2017 and 1/2021: Wrists and knees with no evidence of erosive arthropathy.    INTERVAL HISTORY:  [facility] Established Patient History Form    2/15/2023  12:45 PM PST - Filed by Patient   Chief Complaint arthritis   Interval History of Present Condition   Date of worsening symptom onset: 2/14/2023   Preceding incident/ailment: none   Describe/list your symptoms: pain in joints in the evening   Aggravating factors: Stress   Alleviating factors:    Helpful medications:    Ineffective medications:    Symptom severity/impact (scale of 1-10):    Personal/emotional stressors:    [place] All The Locations Of Pain    REVIEW OF SYSTEMS    General   Fevers No   Chills No   Night sweats No   Unintentional Weight Loss None   Musculoskeletal   Joint pain Same with activity   Morning stiffness None   Joint swelling Yes   Achilles tendon pain No   Muscle pain No   Body Aches    Dermatologic   Hair loss with bald spots No   Hair shedding No   Sunlight-induced skin rash    Skin thickening No   Skin plaques No   Cold-induced color changes (white, purple, red on rewarming)    Neurologic/Psychiatric   Muscle weakness No   Spasms No   Tingling No   Numbness No   Anxiety No   Depression No   Head/Eyes   Headaches No   Temple pain No   Dizziness No   Dry eyes No   Eye pain No   Eye redness No   Blurriness No   Vision loss No   Ears/Nose   Ear pain    Ringing in ears    Vertigo    Hearing loss    Nasal ulcers    Nosebleeds    Sinus pain    Sinonasal congestion    Snoring    Mouth/Throat   Oral ulcers No   Bleeding gums No   Dry mouth No   Cavities No   Sore throat No   Difficulty speaking No   Difficulty swallowing No   Neck/Lymphatics   Neck pain No   Thyroid pain No   Goiter No   Swollen Glands    Cardiac/Respiratory   Chest pain with breathing No   Dry cough No   Cough with bloody phlegm No   Shortness of breath No   Palpitations No   Irregular beats No   Gastrointestinal   Nausea No   Vomiting No   Heartburn No   Abdominal pain No   Bloody diarrhea No   Bloody constipation No   Genitourinary   Pelvic Pain No   Genital ulcers No   Abnormal discharge No   Burning urination No   Frothy urine  No   Blood in urine No   Supplemental Information   Notable Life Changes/Adjustments Since Last Visit none       ACTIVE PROBLEM LIST:  Patient Active Problem List    Diagnosis Date Noted    Immunosuppressed status (HCC) 02/16/2023    Long-term use of immunosuppressant medication 10/17/2022    Long-term use of hydroxychloroquine 10/17/2022    Head lump 10/12/2022    Oligospermia 06/09/2021    Healthcare maintenance 06/09/2021    Internal hemorrhoids 02/21/2020    Hypercholesteremia 11/30/2018    Exocrine pancreatic insufficiency 11/23/2016    Palindromic rheumatism 08/05/2015    History of colonic polyps 07/29/2014    Cystic fibrosis (HCC) 04/09/2014    Sensorineural hearing loss, unilateral 02/12/2013       PAST MEDICAL HISTORY:  Past Medical History:   Diagnosis Date    Arthritis     Cystic fibrosis (HCC)     Exocrine pancreatic insufficiency 11/23/2016    History of colonic polyps 07/29/2014    2 tubular adenomas - 2014, and again 2019. ... q3y     Hypercholesteremia 11/30/2018       PAST SURGICAL HISTORY:  History reviewed. No pertinent surgical history.    MEDICATIONS:  Current Outpatient Medications   Medication Sig    albuterol 108 (90 Base) MCG/ACT Aero Soln inhalation aerosol Inhale 2 Puffs.    omeprazole (PRILOSEC) 40 MG delayed-release capsule Take 1 Capsule by mouth every day.    Idfezvxu-Iwamwrv-Osrkeh&Ivacaf (TRIKAFTA) 100-50-75 & 150 MG Tablet Therapy Pack Take  by mouth.    folic acid (FOLVITE) 1 MG Tab Take 1 Tablet by mouth every day.    predniSONE (DELTASONE) 10 MG Tab 2 TABS DAILY FOR 2 DAYS THEN 1 DAILY FOR 2 DAYS AS NEEDED FOR ARTHRITIS FLARE INDICATIONS: INFLAMMATION OF MORE THAN ONE JOINT    polyethylene glycol 3350 (MIRALAX) 17 GM/SCOOP Powder Take 17 g by mouth every day.    Pancrelipase, Lip-Prot-Amyl, 40492-19564 units Cap DR Particles TAKE 8 CAPSULES 3 TIMES A  DAY WITH MEALS AND 4       CAPSULES 3 TIMES A DAY WITHSNACKS    naproxen (NAPROSYN) 500 MG Tab TAKE 1 TABLET DAILY AND TAKE A  SECOND TABLET IF NEEDED FOR PAIN/INFLAMMATION OF MORE THAN 1 JOINT    methotrexate 2.5 MG Tab TAKE 8 TABLETS ONCE WEEKLY TAKE 4 TABLETS WITH BREAKFAST AND 4TABLETS WITH LUNCH    hydroxychloroquine (PLAQUENIL) 200 MG Tab Take 2 Tabs by mouth every day.       ALLERGIES:   Allergies   Allergen Reactions    Sulfa Drugs Hives     rash       IMMUNIZATIONS:  Immunization History   Administered Date(s) Administered    Hepatitis A Vaccine, Adult 05/17/2002, 11/26/2002    Hepatitis B Vaccine (Adol/Adult) 11/26/2002, 10/31/2006, 05/11/2007    Influenza (IM) Preservative Free - HISTORICAL DATA 11/21/2014, 09/20/2017, 10/11/2018, 10/31/2019    Influenza Seasonal Injectable - Historical Data 10/13/2015    Influenza Vac Subunit Quad Inj (Pf) 10/30/2021    Influenza Vaccine Adult HD 03/19/2010, 09/15/2022    Influenza Vaccine Quad Inj (Pf) 01/16/2014, 10/13/2015, 10/04/2016, 09/20/2017, 10/11/2018, 10/31/2019, 12/20/2020, 12/20/2020    Influenza, Unspecified - HISTORICAL DATA 11/21/2014, 10/13/2015, 10/04/2016    PFIZER PURPLE CAP SARS-COV-2 VACCINATION (12+) 03/30/2021, 04/20/2021, 10/30/2021, 09/15/2022    Pneumococcal polysaccharide vaccine (PPSV-23) 08/07/2015    Tdap Vaccine 02/29/2008, 03/06/2018    Typhoid Vaccine 05/14/2008    dT Vaccine - HISTORICAL DATA 01/13/1999       SOCIAL HISTORY:   Social History     Socioeconomic History    Marital status: Single    Highest education level: Bachelor's degree (e.g., BA, AB, BS)   Tobacco Use    Smoking status: Never    Smokeless tobacco: Never   Vaping Use    Vaping Use: Never used   Substance and Sexual Activity    Alcohol use: Yes     Alcohol/week: 3.6 oz     Types: 4 Glasses of wine, 2 Standard drinks or equivalent per week     Comment: 6 drinks per week    Drug use: Never    Sexual activity: Yes     Partners: Female     Comment:      Social Determinants of Health     Financial Resource Strain: Low Risk     Difficulty of Paying Living Expenses: Not hard at all   Food  "Insecurity: No Food Insecurity    Worried About Running Out of Food in the Last Year: Never true    Ran Out of Food in the Last Year: Never true   Transportation Needs: No Transportation Needs    Lack of Transportation (Medical): No    Lack of Transportation (Non-Medical): No   Physical Activity: Sufficiently Active    Days of Exercise per Week: 3 days    Minutes of Exercise per Session: 50 min   Stress: No Stress Concern Present    Feeling of Stress : Only a little   Social Connections: Moderately Isolated    Frequency of Communication with Friends and Family: More than three times a week    Frequency of Social Gatherings with Friends and Family: Three times a week    Attends Judaism Services: Never    Active Member of Clubs or Organizations: No    Attends Club or Organization Meetings: Never    Marital Status:    Housing Stability: Low Risk     Unable to Pay for Housing in the Last Year: No    Number of Places Lived in the Last Year: 1    Unstable Housing in the Last Year: No       FAMILY HISTORY:  Family History   Problem Relation Age of Onset    Ovarian Cancer Mother         Ovarian Cancer    Cancer Father         Esophageal cancer    Cystic Fibrosis Maternal Uncle     Lung Disease Maternal Uncle         Cystic Fibrosis            Objective     Vital Signs: /74 (BP Location: Left arm, Patient Position: Sitting, BP Cuff Size: Adult)   Pulse 77   Temp 36.5 °C (97.7 °F) (Temporal)   Ht 1.778 m (5' 10\")   Wt 83.6 kg (184 lb 6 oz)   SpO2 93% Body mass index is 26.46 kg/m².    General: Appears well and comfortable  Eyes: No scleral or conjunctival lesions  ENT: No apparent oral or nasal lesions  Head/Neck: No apparent scalp or neck lesions  Cardiovascular: Regular rate and rhythm  Respiratory: Breathing quiet and unlabored  Gastrointestinal: No apparent organomegaly or abdominal masses  Integumentary: No significant cutaneous lesions or discolorations  Musculoskeletal: No significant joint " tenderness, periarticular soft tissue swelling, warmth, erythema, or overt signs of synovitis; no significant restriction in range of motion of joints examined  Neurologic: No focal sensory or motor deficits  Psychiatric: Mood and affect appropriate      LABORATORY RESULTS REVIEWED AND INTERPRETED BY ME:  Lab Results   Component Value Date/Time    WBC 7.2 01/05/2023 10:55 AM    WBC 9.4 12/20/2022 06:53 PM    RBC 5.18 12/20/2022 06:53 PM    HEMOGLOBIN 16.6 01/05/2023 10:55 AM    HEMOGLOBIN 15.7 12/20/2022 06:53 PM    HEMATOCRIT 48.5 01/05/2023 10:55 AM    HEMATOCRIT 45.5 12/20/2022 06:53 PM    MCV 89 01/05/2023 10:55 AM    MCV 87.8 12/20/2022 06:53 PM    MCH 30.4 01/05/2023 10:55 AM    MCH 30.3 12/20/2022 06:53 PM    MCHC 34.2 01/05/2023 10:55 AM    MCHC 34.5 12/20/2022 06:53 PM    RDW 16.8 (H) 01/05/2023 10:55 AM    RDW 50.2 (H) 12/20/2022 06:53 PM    PLATELETCT 429 (H) 01/05/2023 10:55 AM    PLATELETCT 220 12/20/2022 06:53 PM    MPV 8.8 (L) 12/20/2022 06:53 PM    NEUTS 7.49 (H) 12/20/2022 06:53 PM    LYMPHOCYTES 10.30 (L) 12/20/2022 06:53 PM    MONOCYTES 7.70 12/20/2022 06:53 PM    EOSINOPHILS 1.00 12/20/2022 06:53 PM    BASOPHILS 0.50 12/20/2022 06:53 PM     Lab Results   Component Value Date/Time    ASTSGOT 17 12/20/2022 06:53 PM    ALTSGPT 23 12/20/2022 06:53 PM    ALKPHOSPHAT 76 12/20/2022 06:53 PM    TBILIRUBIN 0.6 12/20/2022 06:53 PM    TOTPROTEIN 6.5 12/20/2022 06:53 PM    ALBUMIN 4.1 12/20/2022 06:53 PM     Lab Results   Component Value Date/Time    SODIUM 141 12/20/2022 06:53 PM    POTASSIUM 3.9 12/20/2022 06:53 PM    CHLORIDE 104 12/20/2022 06:53 PM    CO2 22 12/20/2022 06:53 PM    GLUCOSE 90 12/20/2022 06:53 PM    BUN 24 (H) 12/20/2022 06:53 PM    CREATININE 0.96 12/20/2022 06:53 PM    CALCIUM 9.3 12/20/2022 06:53 PM     Lab Results   Component Value Date/Time    CHOLSTRLTOT 235 (H) 06/25/2021 07:21 AM     (H) 06/25/2021 07:21 AM    HDL 69 06/25/2021 07:21 AM    TRIGLYCERIDE 44 06/25/2021 07:21 AM     HBA1C 5.4 01/21/2023 07:12 AM       RADIOLOGY RESULTS REVIEWED AND INTERPRETED BY ME:  Results for orders placed in visit on 01/06/21    DX-KNEE COMPLETE 4+ RIGHT    Impression  No fracture.    This exam was performed in an orthopedic clinic of the [facility].  This interpretation was performed in addition to the interpretation by the ordering provider.    Results for orders placed during the hospital encounter of 12/19/22    MR-KNEE-W/O RIGHT    Impression  No evidence of internal derangement.  No osteochondral lesions.      All relevant laboratory and imaging results reported on this note were reviewed and interpreted by me.         Assessment & Plan     Gene Tate is a 57 y.o. male with history as noted above whose presentation merits the following diagnostic and clinical status impressions and recommendations:    1. Palindromic rheumatism  Clinically low disease activity with no significant evidence of evolving or impending flare on the current regimen, so no need for escalation of treatment. However, given the potential for discordance between immunologic activity and clinical disease manifestations, need to routinely monitor serologic markers of disease activity for complete assessment.  - Miscellaneous Test (anti-CarP); Future  - Sed Rate; Future  - CRP QUANTITIVE (NON-CARDIAC); Future  - Continue methotrexate 20 mg oral weekly with folic acid 1 mg daily  - Continue hydroxychloroquine 400 mg daily    2. Immunosuppressed status (HCC)  Presently with no historical, physical, or laboratory evidence to suggest significant adverse drug effects or opportunistic infections, but need routine monitoring per guidelines.  - Up-to-date on most age-appropriate vaccines but needs Shingrix (said he will go to his local pharmacy for that)  - CBC and CMP every 3 to 4 months (reportedly has standing orders from his PCP)    3. Long-term use of hydroxychloroquine  Minimal to no risk of retinal toxicity given  the low dose of hydroxychloroquine prescribed (less than 5 mg/kg of body weight) per rheumatology and ophthalmology guidelines. However, ophthalmologic evaluation is still recommended with the frequency of routine follow-up eye exams determined by the ophthalmologist, typically annually or every other year.  - Routine ophthalmology evaluation as recommended      FOLLOW-UP: Return in about 9 months (around 11/16/2023) for Short.         Thank you for the opportunity to participate in the care of Gene YANIRA Tate.    Bernardo Cardoso MD, MS  Rheumatologist, [facility] ? [facility]   of Clinical Medicine, Department of Internal Medicine  [facility] ? [facility]

## 2023-02-16 NOTE — PATIENT INSTRUCTIONS
AFTER VISIT INSTRUCTIONS    Below are important information to help you navigate your healthcare needs and help us serve you safely and effectively:  If laboratory tests and/or imaging studies were ordered, remember to go get them done as instructed.  If new prescriptions and/or refills were sent, remember to go pick them up from your local pharmacy, or call the specialty pharmacy to request shipment.  Always take your prescription medications exactly as prescribed unless instructed otherwise.  Note that antirheumatic drugs and steroids are immunosuppressive which means they increase your risk of infections and have multiple potential adverse effects on various organ systems in your body, though most of them are uncommon.  It is important that you are up-to-date on age-appropriate immunizations, particularly shingles and bacterial/viral pneumonia vaccines, which you can request from me or your primary care provider.  Be sure to read the drug package inserts to learn about the potential side effects of your medications before you start taking them.  If you experience any significant drug side effects, stop taking the medication and notify me promptly, and depending on the severity of the side effects, consider going to an urgent care or emergency department for immediate attention.  If there are significant findings on your lab tests and imaging studies that warrant further action, I will notify you with explanations via [employer] or phone call, otherwise you can view them on [employer] and let me know if you have any questions.  Note that [employer] messages are typically read during office hours and may take 1-7 business days before a response depending on the urgency of the situation and how busy my clinic schedule is.  In general, [employer] messaging is for non-urgent matters that do not require immediate attention, so for urgent matters that cannot wait, you are advised to go to an urgent care.  Lastly, you are granted  MyChart access to my documentation of your visit and are encouraged to read my note which details my assessment and plan for your condition.

## 2023-06-09 SDOH — HEALTH STABILITY: PHYSICAL HEALTH: ON AVERAGE, HOW MANY DAYS PER WEEK DO YOU ENGAGE IN MODERATE TO STRENUOUS EXERCISE (LIKE A BRISK WALK)?: 3 DAYS

## 2023-06-09 SDOH — ECONOMIC STABILITY: HOUSING INSECURITY: IN THE LAST 12 MONTHS, HOW MANY PLACES HAVE YOU LIVED?: 1

## 2023-06-09 SDOH — HEALTH STABILITY: PHYSICAL HEALTH: ON AVERAGE, HOW MANY MINUTES DO YOU ENGAGE IN EXERCISE AT THIS LEVEL?: 50 MIN

## 2023-06-09 SDOH — ECONOMIC STABILITY: FOOD INSECURITY: WITHIN THE PAST 12 MONTHS, THE FOOD YOU BOUGHT JUST DIDN'T LAST AND YOU DIDN'T HAVE MONEY TO GET MORE.: NEVER TRUE

## 2023-06-09 SDOH — ECONOMIC STABILITY: FOOD INSECURITY: WITHIN THE PAST 12 MONTHS, YOU WORRIED THAT YOUR FOOD WOULD RUN OUT BEFORE YOU GOT MONEY TO BUY MORE.: NEVER TRUE

## 2023-06-09 SDOH — ECONOMIC STABILITY: INCOME INSECURITY: IN THE LAST 12 MONTHS, WAS THERE A TIME WHEN YOU WERE NOT ABLE TO PAY THE MORTGAGE OR RENT ON TIME?: NO

## 2023-06-09 SDOH — ECONOMIC STABILITY: INCOME INSECURITY: HOW HARD IS IT FOR YOU TO PAY FOR THE VERY BASICS LIKE FOOD, HOUSING, MEDICAL CARE, AND HEATING?: NOT HARD AT ALL

## 2023-06-09 ASSESSMENT — SOCIAL DETERMINANTS OF HEALTH (SDOH)
HOW OFTEN DO YOU ATTEND CHURCH OR RELIGIOUS SERVICES?: NEVER
IN A TYPICAL WEEK, HOW MANY TIMES DO YOU TALK ON THE PHONE WITH FAMILY, FRIENDS, OR NEIGHBORS?: MORE THAN THREE TIMES A WEEK
HOW OFTEN DO YOU GET TOGETHER WITH FRIENDS OR RELATIVES?: THREE TIMES A WEEK
HOW OFTEN DO YOU ATTEND CHURCH OR RELIGIOUS SERVICES?: NEVER
HOW OFTEN DO YOU HAVE SIX OR MORE DRINKS ON ONE OCCASION: NEVER
HOW MANY DRINKS CONTAINING ALCOHOL DO YOU HAVE ON A TYPICAL DAY WHEN YOU ARE DRINKING: 1 OR 2
HOW OFTEN DO YOU ATTENT MEETINGS OF THE CLUB OR ORGANIZATION YOU BELONG TO?: NEVER
HOW OFTEN DO YOU GET TOGETHER WITH FRIENDS OR RELATIVES?: THREE TIMES A WEEK
HOW OFTEN DO YOU HAVE A DRINK CONTAINING ALCOHOL: 2-3 TIMES A WEEK
IN A TYPICAL WEEK, HOW MANY TIMES DO YOU TALK ON THE PHONE WITH FAMILY, FRIENDS, OR NEIGHBORS?: MORE THAN THREE TIMES A WEEK
DO YOU BELONG TO ANY CLUBS OR ORGANIZATIONS SUCH AS CHURCH GROUPS UNIONS, FRATERNAL OR ATHLETIC GROUPS, OR SCHOOL GROUPS?: NO
DO YOU BELONG TO ANY CLUBS OR ORGANIZATIONS SUCH AS CHURCH GROUPS UNIONS, FRATERNAL OR ATHLETIC GROUPS, OR SCHOOL GROUPS?: NO
HOW HARD IS IT FOR YOU TO PAY FOR THE VERY BASICS LIKE FOOD, HOUSING, MEDICAL CARE, AND HEATING?: NOT HARD AT ALL
WITHIN THE PAST 12 MONTHS, YOU WORRIED THAT YOUR FOOD WOULD RUN OUT BEFORE YOU GOT THE MONEY TO BUY MORE: NEVER TRUE
HOW OFTEN DO YOU ATTENT MEETINGS OF THE CLUB OR ORGANIZATION YOU BELONG TO?: NEVER

## 2023-06-09 ASSESSMENT — LIFESTYLE VARIABLES
HOW MANY STANDARD DRINKS CONTAINING ALCOHOL DO YOU HAVE ON A TYPICAL DAY: 1 OR 2
AUDIT-C TOTAL SCORE: 3
SKIP TO QUESTIONS 9-10: 1
HOW OFTEN DO YOU HAVE SIX OR MORE DRINKS ON ONE OCCASION: NEVER
HOW OFTEN DO YOU HAVE A DRINK CONTAINING ALCOHOL: 2-3 TIMES A WEEK

## 2023-06-12 ENCOUNTER — APPOINTMENT (OUTPATIENT)
Dept: INTERNAL MEDICINE | Facility: IMAGING CENTER | Age: 58
End: 2023-06-12
Payer: COMMERCIAL

## 2023-06-29 ENCOUNTER — OFFICE VISIT (OUTPATIENT)
Dept: INTERNAL MEDICINE | Facility: IMAGING CENTER | Age: 58
End: 2023-06-29
Payer: COMMERCIAL

## 2023-06-29 VITALS
SYSTOLIC BLOOD PRESSURE: 124 MMHG | HEIGHT: 70 IN | OXYGEN SATURATION: 95 % | HEART RATE: 80 BPM | TEMPERATURE: 98.1 F | DIASTOLIC BLOOD PRESSURE: 70 MMHG | BODY MASS INDEX: 26.05 KG/M2 | WEIGHT: 182 LBS | RESPIRATION RATE: 14 BRPM

## 2023-06-29 DIAGNOSIS — K86.81 EXOCRINE PANCREATIC INSUFFICIENCY: ICD-10-CM

## 2023-06-29 DIAGNOSIS — E78.00 HYPERCHOLESTEREMIA: ICD-10-CM

## 2023-06-29 DIAGNOSIS — E84.9 CYSTIC FIBROSIS (HCC): ICD-10-CM

## 2023-06-29 DIAGNOSIS — Z00.00 LABORATORY EXAMINATION ORDERED AS PART OF A ROUTINE GENERAL MEDICAL EXAMINATION: ICD-10-CM

## 2023-06-29 DIAGNOSIS — R22.0 HEAD LUMP: ICD-10-CM

## 2023-06-29 DIAGNOSIS — M12.30 PALINDROMIC RHEUMATISM: ICD-10-CM

## 2023-06-29 DIAGNOSIS — Z76.89 ESTABLISHING CARE WITH NEW DOCTOR, ENCOUNTER FOR: ICD-10-CM

## 2023-06-29 DIAGNOSIS — Z12.5 PROSTATE CANCER SCREENING: ICD-10-CM

## 2023-06-29 DIAGNOSIS — D84.9 IMMUNOSUPPRESSED STATUS (HCC): ICD-10-CM

## 2023-06-29 DIAGNOSIS — Z86.010 HISTORY OF COLONIC POLYPS: ICD-10-CM

## 2023-06-29 PROBLEM — K21.9 ESOPHAGEAL REFLUX: Status: ACTIVE | Noted: 2023-06-29

## 2023-06-29 PROCEDURE — 3074F SYST BP LT 130 MM HG: CPT | Performed by: FAMILY MEDICINE

## 2023-06-29 PROCEDURE — 99214 OFFICE O/P EST MOD 30 MIN: CPT | Performed by: FAMILY MEDICINE

## 2023-06-29 PROCEDURE — 3078F DIAST BP <80 MM HG: CPT | Performed by: FAMILY MEDICINE

## 2023-06-29 RX ORDER — POLYETHYLENE GLYCOL 3350
17 POWDER (GRAM) MISCELLANEOUS DAILY
COMMUNITY
End: 2023-06-29

## 2023-06-29 RX ORDER — AZITHROMYCIN 500 MG/1
TABLET, FILM COATED ORAL
COMMUNITY
Start: 2023-06-23 | End: 2024-02-22 | Stop reason: SDUPTHER

## 2023-06-29 RX ORDER — OMEPRAZOLE 40 MG/1
40 CAPSULE, DELAYED RELEASE ORAL DAILY
COMMUNITY
Start: 2023-02-21

## 2023-06-29 RX ORDER — ATOVAQUONE AND PROGUANIL HYDROCHLORIDE 250; 100 MG/1; MG/1
TABLET, FILM COATED ORAL
COMMUNITY
Start: 2023-06-23

## 2023-06-29 RX ORDER — HYDROXYCHLOROQUINE SULFATE 200 MG/1
400 TABLET, FILM COATED ORAL DAILY
COMMUNITY

## 2023-06-29 ASSESSMENT — FIBROSIS 4 INDEX: FIB4 SCORE: 0.7

## 2023-06-29 ASSESSMENT — PATIENT HEALTH QUESTIONNAIRE - PHQ9: CLINICAL INTERPRETATION OF PHQ2 SCORE: 0

## 2023-06-30 NOTE — PROGRESS NOTES
"No chief complaint on file.      Subjective:     HPI:   Gene Tate is a 58 y.o. male here  to Establishing care with new doctor, discuss the evaluation and management of:          1. 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 Jean-Paul Quinn       3. Hypercholesteremia  CT-CARDIAC SCORING    Lipid Profile      4. Head lump -chronic, previous ultrasound in October.  Feels like it may be getting bigger Referral to General Surgery      5. Exocrine pancreatic insufficiency  Secondary to CF      6. Immunosuppressed status (HCC)  Stable, discussed getting pneumonia vaccine at next visit      7. History of colonic polyps  Recent colonoscopy in April               Problem   Esophageal Reflux    Formatting of this note might be different from the original.  Neg endoscopy 1993.  In 2018 he denies any GERD symptoms but taking omeprazole because it the lower acidity helps the enzymes remain active when they go through the stomach, and protective with Naproxen.               Objective:     /70   Pulse 80   Temp 36.7 °C (98.1 °F)   Resp 14   Ht 1.778 m (5' 10\")   Wt 82.6 kg (182 lb)   SpO2 95%  Body mass index is 26.11 kg/m².    Physical Exam:  Physical Exam  Constitutional: Well-developed and well-nourished. Not diaphoretic. No distress.   Skin: Skin is warm and dry. No rash noted.  Head: Atraumatic without lesions.  Approximately 1 to 2 cm soft circular mass-occipital scalp  Eyes: Conjunctivae and extraocular motions are normal.   Ears:  External ears unremarkable.    Nose: Nares patent.       Neck: Supple, No thyromegaly present. No JVD   Chest: Effort normal.    Abdomen:  without distention.  .  Extremities: No cyanosis, clubbing, erythema, nor edema.   Neurological: Alert and oriented x 3.    Psychiatric:  Behavior, mood, " and affect are appropriate     Assessment and Plan:     The following treatment plan was discussed/researched:  No problem-specific Assessment & Plan notes found for this encounter.     1. Cystic fibrosis (HCC)  - Pneumococcal Conjugate Vaccine 20-Valent (19 yrs+)-    We will  wait  for next visit he recently had typhoid vaccine      - VITAMIN D,25 HYDROXY (DEFICIENCY); Future  - VITAMIN B12; Future    2. Palindromic rheumatism-under the care of [facility]/Dr. Jean-Paul Quinn     3. Hypercholesteremia-assess statin dosing  - CT-CARDIAC SCORING; Future  - Lipid Profile; Future    4. Head lump  - Referral to General Surgery    5. Exocrine pancreatic insufficiency  - HEMOGLOBIN A1C; Future    6. Immunosuppressed status (HCC)-continue current meds, he will follow-up with ophthalmology    7. History of colonic polyps-recent colonoscopy 2 months ago  Will obtain report    8. Prostate cancer screening  - PROSTATE SPECIFIC AG SCREENING; Future    9. Laboratory examination ordered as part of a routine general medical examination  - CBC WITHOUT DIFFERENTIAL; Future  - TSH; Future  - Comp Metabolic Panel; Future    10. Establishing care with new doctor, encounter for-reviewed social, family, past medical history    Other orders  - atovaquone-proguanil (MALARONE) 250-100 MG per tablet; Take 1 tablet a day. Start 1 day prior to malaria exposure, daily during stay and continue daily until 7 days after exposure  - azithromycin (ZITHROMAX) 500 MG tablet; TAKE ONE TAB BY MOUTH DAILY FOR 3 DAYS INDICATIONS: TRAVELER'S DIARRHEA  - hydroxychloroquine (PLAQUENIL) 200 MG Tab; Take 400 mg by mouth every day.  - omeprazole (PRILOSEC) 40 MG delayed-release capsule; Take 40 mg by mouth every day.                                                                                                                                                                                           Any change or worsening of signs or symptoms, patient  encouraged to follow-up or report to emergency room for further evaluation. Patient verbalizes understanding and agrees.    Follow-Up: No follow-ups on file.      PLEASE NOTE: This dictation was created using voice recognition software. I have made every reasonable attempt to correct obvious errors, but I expect that there are errors of grammar and possibly content that I did not discover before finalizing the note.      My total time spent caring for the patient on the day of the encounter was  greater than 40 minutes.   This includes obtaining history, reviewing chart, physical exam, patient education, reviewing outside records, placing orders, interpreting tests and coordinating care.

## 2023-07-12 ENCOUNTER — HOSPITAL ENCOUNTER (OUTPATIENT)
Facility: MEDICAL CENTER | Age: 58
End: 2023-07-12
Attending: FAMILY MEDICINE
Payer: COMMERCIAL

## 2023-07-12 ENCOUNTER — NON-PROVIDER VISIT (OUTPATIENT)
Dept: INTERNAL MEDICINE | Facility: IMAGING CENTER | Age: 58
End: 2023-07-12
Payer: COMMERCIAL

## 2023-07-12 DIAGNOSIS — Z01.89 ENCOUNTER FOR ROUTINE LABORATORY TESTING: ICD-10-CM

## 2023-07-12 DIAGNOSIS — K86.81 EXOCRINE PANCREATIC INSUFFICIENCY: ICD-10-CM

## 2023-07-12 DIAGNOSIS — E78.00 HYPERCHOLESTEREMIA: ICD-10-CM

## 2023-07-12 DIAGNOSIS — Z00.00 LABORATORY EXAMINATION ORDERED AS PART OF A ROUTINE GENERAL MEDICAL EXAMINATION: ICD-10-CM

## 2023-07-12 DIAGNOSIS — Z12.5 PROSTATE CANCER SCREENING: ICD-10-CM

## 2023-07-12 DIAGNOSIS — E84.9 CYSTIC FIBROSIS (HCC): ICD-10-CM

## 2023-07-12 DIAGNOSIS — Z23 NEED FOR PNEUMOCOCCAL 20-VALENT CONJUGATE VACCINATION: ICD-10-CM

## 2023-07-12 LAB
25(OH)D3 SERPL-MCNC: 54 NG/ML (ref 30–100)
ALBUMIN SERPL BCP-MCNC: 4.3 G/DL (ref 3.2–4.9)
ALBUMIN/GLOB SERPL: 1.7 G/DL
ALP SERPL-CCNC: 71 U/L (ref 30–99)
ALT SERPL-CCNC: 20 U/L (ref 2–50)
ANION GAP SERPL CALC-SCNC: 10 MMOL/L (ref 7–16)
AST SERPL-CCNC: 11 U/L (ref 12–45)
BILIRUB SERPL-MCNC: 0.5 MG/DL (ref 0.1–1.5)
BUN SERPL-MCNC: 25 MG/DL (ref 8–22)
CALCIUM ALBUM COR SERPL-MCNC: 9.7 MG/DL (ref 8.5–10.5)
CALCIUM SERPL-MCNC: 9.9 MG/DL (ref 8.5–10.5)
CHLORIDE SERPL-SCNC: 102 MMOL/L (ref 96–112)
CHOLEST SERPL-MCNC: 251 MG/DL (ref 100–199)
CO2 SERPL-SCNC: 27 MMOL/L (ref 20–33)
CREAT SERPL-MCNC: 0.81 MG/DL (ref 0.5–1.4)
ERYTHROCYTE [DISTWIDTH] IN BLOOD BY AUTOMATED COUNT: 47.8 FL (ref 35.9–50)
EST. AVERAGE GLUCOSE BLD GHB EST-MCNC: 111 MG/DL
GFR SERPLBLD CREATININE-BSD FMLA CKD-EPI: 101 ML/MIN/1.73 M 2
GLOBULIN SER CALC-MCNC: 2.5 G/DL (ref 1.9–3.5)
GLUCOSE SERPL-MCNC: 93 MG/DL (ref 65–99)
HBA1C MFR BLD: 5.5 % (ref 4–5.6)
HCT VFR BLD AUTO: 47.4 % (ref 42–52)
HDLC SERPL-MCNC: 76 MG/DL
HGB BLD-MCNC: 16 G/DL (ref 14–18)
LDLC SERPL CALC-MCNC: 158 MG/DL
MCH RBC QN AUTO: 30 PG (ref 27–33)
MCHC RBC AUTO-ENTMCNC: 33.8 G/DL (ref 32.3–36.5)
MCV RBC AUTO: 88.8 FL (ref 81.4–97.8)
PLATELET # BLD AUTO: 271 K/UL (ref 164–446)
PMV BLD AUTO: 8.9 FL (ref 9–12.9)
POTASSIUM SERPL-SCNC: 4.3 MMOL/L (ref 3.6–5.5)
PROT SERPL-MCNC: 6.8 G/DL (ref 6–8.2)
PSA SERPL-MCNC: 1.05 NG/ML (ref 0–4)
RBC # BLD AUTO: 5.34 M/UL (ref 4.7–6.1)
SODIUM SERPL-SCNC: 139 MMOL/L (ref 135–145)
TRIGL SERPL-MCNC: 85 MG/DL (ref 0–149)
TSH SERPL DL<=0.005 MIU/L-ACNC: 2.29 UIU/ML (ref 0.38–5.33)
VIT B12 SERPL-MCNC: 476 PG/ML (ref 211–911)
WBC # BLD AUTO: 6.7 K/UL (ref 4.8–10.8)

## 2023-07-12 PROCEDURE — 90677 PCV20 VACCINE IM: CPT | Performed by: FAMILY MEDICINE

## 2023-07-12 PROCEDURE — 83036 HEMOGLOBIN GLYCOSYLATED A1C: CPT

## 2023-07-12 PROCEDURE — 84153 ASSAY OF PSA TOTAL: CPT

## 2023-07-12 PROCEDURE — 90471 IMMUNIZATION ADMIN: CPT | Performed by: FAMILY MEDICINE

## 2023-07-12 PROCEDURE — 82306 VITAMIN D 25 HYDROXY: CPT

## 2023-07-12 PROCEDURE — 84443 ASSAY THYROID STIM HORMONE: CPT

## 2023-07-12 PROCEDURE — 85027 COMPLETE CBC AUTOMATED: CPT

## 2023-07-12 PROCEDURE — 80061 LIPID PANEL: CPT

## 2023-07-12 PROCEDURE — 82607 VITAMIN B-12: CPT

## 2023-07-12 PROCEDURE — 80053 COMPREHEN METABOLIC PANEL: CPT

## 2023-07-15 ENCOUNTER — HOSPITAL ENCOUNTER (OUTPATIENT)
Dept: RADIOLOGY | Facility: MEDICAL CENTER | Age: 58
End: 2023-07-15
Attending: FAMILY MEDICINE
Payer: COMMERCIAL

## 2023-07-15 DIAGNOSIS — E78.00 HYPERCHOLESTEREMIA: ICD-10-CM

## 2023-07-15 PROCEDURE — 4410556 CT-CARDIAC SCORING (SELF PAY ONLY)

## 2023-07-25 ENCOUNTER — OFFICE VISIT (OUTPATIENT)
Dept: INTERNAL MEDICINE | Facility: IMAGING CENTER | Age: 58
End: 2023-07-25
Payer: COMMERCIAL

## 2023-07-25 VITALS
HEART RATE: 61 BPM | WEIGHT: 182 LBS | DIASTOLIC BLOOD PRESSURE: 72 MMHG | SYSTOLIC BLOOD PRESSURE: 126 MMHG | HEIGHT: 70 IN | BODY MASS INDEX: 26.05 KG/M2 | RESPIRATION RATE: 14 BRPM | OXYGEN SATURATION: 96 % | TEMPERATURE: 97 F

## 2023-07-25 DIAGNOSIS — Z23 NEED FOR SHINGLES VACCINE: ICD-10-CM

## 2023-07-25 DIAGNOSIS — Z71.84 TRAVEL ADVICE ENCOUNTER: ICD-10-CM

## 2023-07-25 PROCEDURE — 90750 HZV VACC RECOMBINANT IM: CPT | Performed by: FAMILY MEDICINE

## 2023-07-25 PROCEDURE — 3074F SYST BP LT 130 MM HG: CPT | Performed by: FAMILY MEDICINE

## 2023-07-25 PROCEDURE — 99396 PREV VISIT EST AGE 40-64: CPT | Mod: 25 | Performed by: FAMILY MEDICINE

## 2023-07-25 PROCEDURE — 3078F DIAST BP <80 MM HG: CPT | Performed by: FAMILY MEDICINE

## 2023-07-25 PROCEDURE — 90471 IMMUNIZATION ADMIN: CPT | Performed by: FAMILY MEDICINE

## 2023-07-25 RX ORDER — LEVOFLOXACIN 750 MG/1
750 TABLET, FILM COATED ORAL DAILY
Qty: 10 TABLET | Refills: 0 | Status: SHIPPED | OUTPATIENT
Start: 2023-07-25 | End: 2024-02-22 | Stop reason: SDUPTHER

## 2023-07-25 RX ORDER — ZOLPIDEM TARTRATE 10 MG/1
10 TABLET ORAL NIGHTLY PRN
Qty: 30 TABLET | Refills: 0 | Status: SHIPPED | OUTPATIENT
Start: 2023-07-25 | End: 2023-08-24

## 2023-07-25 ASSESSMENT — FIBROSIS 4 INDEX: FIB4 SCORE: 0.54

## 2023-08-20 NOTE — PROGRESS NOTES
Subjective:     CC:   Chief Complaint   Patient presents with    Annual Exam       HPI:   Gene Tate is a 58 y.o. male who presents for an annual exam. He is feeling well and has no complaints.  He has CF/ Dr Stevens  Labs q 3 m.          Health Maintenance  Diet:  regular  Exercise: walk and golf        He  has a past medical history of Arthritis, Cystic fibrosis (HCC), Exocrine pancreatic insufficiency (11/23/2016), History of colonic polyps (07/29/2014), and Hypercholesteremia (11/30/2018).  He  has no past surgical history on file.  Family History   Problem Relation Age of Onset    Ovarian Cancer Mother         Ovarian Cancer    Cancer Father         Esophageal cancer    Cystic Fibrosis Maternal Uncle     Lung Disease Maternal Uncle         Cystic Fibrosis    No Known Problems Son     No Known Problems Son      Social History     Tobacco Use    Smoking status: Never    Smokeless tobacco: Never   Vaping Use    Vaping Use: Never used   Substance Use Topics    Alcohol use: Yes     Alcohol/week: 3.6 oz     Types: 4 Glasses of wine, 2 Standard drinks or equivalent per week     Comment: 6 drinks per week    Drug use: Never       Patient Active Problem List    Diagnosis Date Noted    Esophageal reflux 06/29/2023    Immunosuppressed status (HCC) 02/16/2023    Long-term use of immunosuppressant medication 10/17/2022    Long-term use of hydroxychloroquine 10/17/2022    Head lump 10/12/2022    Oligospermia 06/09/2021    Healthcare maintenance 06/09/2021    Internal hemorrhoids 02/21/2020    Hypercholesteremia 11/30/2018    Exocrine pancreatic insufficiency 11/23/2016    Palindromic rheumatism 08/05/2015    History of colonic polyps 07/29/2014    Cystic fibrosis (HCC) 04/09/2014    Sensorineural hearing loss, unilateral 02/12/2013       Current Outpatient Medications   Medication Sig Dispense Refill    zolpidem (AMBIEN) 10 MG Tab Take 1 Tablet by mouth at bedtime as needed for Sleep for up to 30 days. 30 Tablet 0     "levoFLOXacin (LEVAQUIN) 750 MG tablet Take 1 Tablet by mouth every day. 10 Tablet 0    atovaquone-proguanil (MALARONE) 250-100 MG per tablet Take 1 tablet a day. Start 1 day prior to malaria exposure, daily during stay and continue daily until 7 days after exposure      azithromycin (ZITHROMAX) 500 MG tablet TAKE ONE TAB BY MOUTH DAILY FOR 3 DAYS INDICATIONS: TRAVELER'S DIARRHEA      hydroxychloroquine (PLAQUENIL) 200 MG Tab Take 400 mg by mouth every day.      omeprazole (PRILOSEC) 40 MG delayed-release capsule Take 40 mg by mouth every day.      albuterol 108 (90 Base) MCG/ACT Aero Soln inhalation aerosol Inhale 2 Puffs.      Iwrkjpgi-Ghmigxw-Qiajpc&Ivacaf (TRIKAFTA) 100-50-75 & 150 MG Tablet Therapy Pack Take  by mouth.      folic acid (FOLVITE) 1 MG Tab Take 1 Tablet by mouth every day.      predniSONE (DELTASONE) 10 MG Tab 2 TABS DAILY FOR 2 DAYS THEN 1 DAILY FOR 2 DAYS AS NEEDED FOR ARTHRITIS FLARE INDICATIONS: INFLAMMATION OF MORE THAN ONE JOINT      polyethylene glycol 3350 (MIRALAX) 17 GM/SCOOP Powder Take 17 g by mouth every day.      Pancrelipase, Lip-Prot-Amyl, 31654-62010 units Cap DR Particles TAKE 8 CAPSULES 3 TIMES A  DAY WITH MEALS AND 4       CAPSULES 3 TIMES A DAY WITHSNACKS      naproxen (NAPROSYN) 500 MG Tab TAKE 1 TABLET DAILY AND TAKE A SECOND TABLET IF NEEDED FOR PAIN/INFLAMMATION OF MORE THAN 1 JOINT      methotrexate 2.5 MG Tab TAKE 8 TABLETS ONCE WEEKLY TAKE 4 TABLETS WITH BREAKFAST AND 4TABLETS WITH LUNCH       No current facility-administered medications for this visit.    (including changes today)  Allergies: Sulfa drugs    Review of Systems   @ROS@    Objective:     /72   Pulse 61   Temp 36.1 °C (97 °F)   Resp 14   Ht 1.778 m (5' 10\")   Wt 82.6 kg (182 lb)   SpO2 96%   BMI 26.11 kg/m²   Wt Readings from Last 4 Encounters:   07/25/23 82.6 kg (182 lb)   06/29/23 82.6 kg (182 lb)   02/16/23 83.6 kg (184 lb 6 oz)   12/20/22 84.1 kg (185 lb 6.5 oz)       Physical " Exam:  Physical Exam  Constitutional:       General: He is not in acute distress.     Appearance: Normal appearance.   HENT:      Head: Normocephalic and atraumatic.      Nose: Nose normal.      Mouth/Throat:      Mouth: Mucous membranes are moist.   Eyes:      Conjunctiva/sclera: Conjunctivae normal.   Cardiovascular:      Rate and Rhythm: Normal rate and regular rhythm.   Pulmonary:      Effort: Pulmonary effort is normal.      Breath sounds: Normal breath sounds. No wheezing.   Abdominal:      General: Abdomen is flat.      Palpations: Abdomen is soft.   Musculoskeletal:      Cervical back: No rigidity.      Right lower leg: No edema.      Left lower leg: No edema.   Lymphadenopathy:      Cervical: No cervical adenopathy.   Skin:     Coloration: Skin is not jaundiced.      Findings: No erythema.   Neurological:      General: No focal deficit present.      Mental Status: He is alert and oriented to person, place, and time. Mental status is at baseline.   Psychiatric:         Mood and Affect: Mood normal.         Behavior: Behavior normal.         Thought Content: Thought content normal.         Judgment: Judgment normal.         Assessment and Plan:     Annual Exam    1. Need for shingles vaccine     - Shingles Vaccine (Shingrix)    2. Travel advice encounter     - zolpidem (AMBIEN) 10 MG Tab; Take 1 Tablet by mouth at bedtime as needed for Sleep for up to 30 days.  Dispense: 30 Tablet; Refill: 0      HCM: UTD.  Labs per orders.  Vaccinations per orders.    Follow-up: Annually

## 2023-11-06 ENCOUNTER — TELEPHONE (OUTPATIENT)
Dept: INTERNAL MEDICINE | Facility: IMAGING CENTER | Age: 58
End: 2023-11-06

## 2023-11-06 ENCOUNTER — NON-PROVIDER VISIT (OUTPATIENT)
Dept: INTERNAL MEDICINE | Facility: IMAGING CENTER | Age: 58
End: 2023-11-06
Payer: COMMERCIAL

## 2023-11-06 ENCOUNTER — APPOINTMENT (OUTPATIENT)
Dept: INTERNAL MEDICINE | Facility: IMAGING CENTER | Age: 58
End: 2023-11-06
Payer: COMMERCIAL

## 2023-11-06 ENCOUNTER — HOSPITAL ENCOUNTER (OUTPATIENT)
Facility: MEDICAL CENTER | Age: 58
End: 2023-11-06
Attending: FAMILY MEDICINE
Payer: COMMERCIAL

## 2023-11-06 DIAGNOSIS — R19.7 DIARRHEA, UNSPECIFIED TYPE: ICD-10-CM

## 2023-11-06 DIAGNOSIS — Z91.89 AT RISK FOR INFECTIOUS DISEASE DUE TO RECENT FOREIGN TRAVEL: ICD-10-CM

## 2023-11-06 LAB
C DIFF DNA SPEC QL NAA+PROBE: NEGATIVE
C DIFF TOX GENS STL QL NAA+PROBE: NEGATIVE

## 2023-11-06 PROCEDURE — 87899 AGENT NOS ASSAY W/OPTIC: CPT

## 2023-11-06 PROCEDURE — 87493 C DIFF AMPLIFIED PROBE: CPT

## 2023-11-06 PROCEDURE — 87046 STOOL CULTR AEROBIC BACT EA: CPT

## 2023-11-06 PROCEDURE — 87177 OVA AND PARASITES SMEARS: CPT

## 2023-11-06 PROCEDURE — 87209 SMEAR COMPLEX STAIN: CPT

## 2023-11-06 PROCEDURE — 87077 CULTURE AEROBIC IDENTIFY: CPT

## 2023-11-06 PROCEDURE — 87045 FECES CULTURE AEROBIC BACT: CPT

## 2023-11-06 NOTE — TELEPHONE ENCOUNTER
Diarrhea on & off x 2 weeks since a trip to [place].  Treated with Azithromycin 500 mg x 3 days with temporary improvement.  Last 5 days reports yellow watery diarrhea 10X/day.  Denies nausea, vomiting, fevers, blood or mucus in stool.

## 2023-11-07 LAB
E COLI SXT1+2 STL IA: NORMAL
SIGNIFICANT IND 70042: NORMAL
SITE SITE: NORMAL
SOURCE SOURCE: NORMAL

## 2023-11-09 LAB
BACTERIA STL CULT: NORMAL
E COLI SXT1+2 STL IA: NORMAL
SIGNIFICANT IND 70042: NORMAL
SITE SITE: NORMAL
SOURCE SOURCE: NORMAL

## 2023-11-11 LAB — OVA AND PARASITE, FECAL INTERPRETATION Q0595: NEGATIVE

## 2023-11-16 ENCOUNTER — APPOINTMENT (OUTPATIENT)
Dept: RHEUMATOLOGY | Facility: MEDICAL CENTER | Age: 58
End: 2023-11-16
Attending: INTERNAL MEDICINE
Payer: COMMERCIAL

## 2023-11-20 ENCOUNTER — PATIENT MESSAGE (OUTPATIENT)
Dept: INTERNAL MEDICINE | Facility: IMAGING CENTER | Age: 58
End: 2023-11-20
Payer: COMMERCIAL

## 2023-11-20 RX ORDER — ROSUVASTATIN CALCIUM 5 MG/1
5 TABLET, COATED ORAL EVERY EVENING
Qty: 60 TABLET | Refills: 0 | Status: SHIPPED | OUTPATIENT
Start: 2023-11-20 | End: 2024-01-16

## 2024-01-16 RX ORDER — ROSUVASTATIN CALCIUM 5 MG/1
5 TABLET, COATED ORAL EVERY EVENING
Qty: 90 TABLET | Refills: 3 | Status: SHIPPED | OUTPATIENT
Start: 2024-01-16 | End: 2024-03-05 | Stop reason: SINTOL

## 2024-01-21 ENCOUNTER — PATIENT MESSAGE (OUTPATIENT)
Dept: INTERNAL MEDICINE | Facility: IMAGING CENTER | Age: 59
End: 2024-01-21
Payer: COMMERCIAL

## 2024-01-25 RX ORDER — EZETIMIBE 10 MG/1
10 TABLET ORAL DAILY
Qty: 30 TABLET | Refills: 3 | Status: SHIPPED | OUTPATIENT
Start: 2024-01-25 | End: 2024-03-09

## 2024-02-22 ENCOUNTER — NON-PROVIDER VISIT (OUTPATIENT)
Dept: INTERNAL MEDICINE | Facility: IMAGING CENTER | Age: 59
End: 2024-02-22
Payer: COMMERCIAL

## 2024-02-22 ENCOUNTER — OFFICE VISIT (OUTPATIENT)
Dept: INTERNAL MEDICINE | Facility: IMAGING CENTER | Age: 59
End: 2024-02-22
Payer: COMMERCIAL

## 2024-02-22 VITALS
RESPIRATION RATE: 14 BRPM | BODY MASS INDEX: 26.11 KG/M2 | TEMPERATURE: 98.1 F | HEIGHT: 70 IN | DIASTOLIC BLOOD PRESSURE: 78 MMHG | SYSTOLIC BLOOD PRESSURE: 140 MMHG | OXYGEN SATURATION: 91 % | HEART RATE: 86 BPM

## 2024-02-22 DIAGNOSIS — E84.9 CYSTIC FIBROSIS (HCC): ICD-10-CM

## 2024-02-22 DIAGNOSIS — E78.00 HYPERCHOLESTEREMIA: ICD-10-CM

## 2024-02-22 DIAGNOSIS — J22 LRTI (LOWER RESPIRATORY TRACT INFECTION): ICD-10-CM

## 2024-02-22 DIAGNOSIS — R05.1 ACUTE COUGH: ICD-10-CM

## 2024-02-22 DIAGNOSIS — Z00.00 HEALTHCARE MAINTENANCE: ICD-10-CM

## 2024-02-22 LAB
FLUAV RNA SPEC QL NAA+PROBE: NEGATIVE
FLUBV RNA SPEC QL NAA+PROBE: NEGATIVE
RSV RNA SPEC QL NAA+PROBE: NEGATIVE
SARS-COV-2 RNA RESP QL NAA+PROBE: NEGATIVE

## 2024-02-22 PROCEDURE — 3077F SYST BP >= 140 MM HG: CPT | Performed by: FAMILY MEDICINE

## 2024-02-22 PROCEDURE — 0241U POCT CEPHEID COV-2, FLU A/B, RSV - PCR: CPT | Performed by: FAMILY MEDICINE

## 2024-02-22 PROCEDURE — 3078F DIAST BP <80 MM HG: CPT | Performed by: FAMILY MEDICINE

## 2024-02-22 PROCEDURE — 99214 OFFICE O/P EST MOD 30 MIN: CPT | Performed by: FAMILY MEDICINE

## 2024-02-22 RX ORDER — LEVOFLOXACIN 750 MG/1
750 TABLET, FILM COATED ORAL DAILY
Qty: 10 TABLET | Refills: 0 | Status: SHIPPED | OUTPATIENT
Start: 2024-02-22 | End: 2024-03-05

## 2024-02-22 RX ORDER — AZITHROMYCIN 500 MG/1
TABLET, FILM COATED ORAL
Qty: 3 TABLET | Refills: 3 | Status: SHIPPED | OUTPATIENT
Start: 2024-02-22

## 2024-02-22 RX ORDER — BUDESONIDE AND FORMOTEROL FUMARATE DIHYDRATE 160; 4.5 UG/1; UG/1
2 AEROSOL RESPIRATORY (INHALATION) 2 TIMES DAILY
Qty: 1 EACH | Refills: 3 | Status: SHIPPED | OUTPATIENT
Start: 2024-02-22

## 2024-02-22 ASSESSMENT — PATIENT HEALTH QUESTIONNAIRE - PHQ9: CLINICAL INTERPRETATION OF PHQ2 SCORE: 0

## 2024-02-22 NOTE — PROGRESS NOTES
"Verbal consent was acquired by the patient to use [employer] ambient listening note generation during this visit      Patient is a 58 y.o.male w/ CF who presents today to discuss cough    History of Present Illness  The patient presents for evaluation of multiple medical concerns.    She recently traveled to [place] and [facility] and returned on 2024. She was not around anyone that was sick. No one else is sick at home currently. Yesterday morning, she had a little bit of a cough. When she woke up this morning, her chest was tight and she was getting a lot of thick greenish phlegm out of her lungs. She denies any head congestion or pain. She always gets something in her lungs, which gets worse every time she gets this classic chest infection. She always gets mental anxiety. It is hard to take a deep breath. She denies any body aches, fever, chills, or sore throat. She has an inhaler at home, but it  at the end of 2023, so she needs an albuterol inhaler. She also uses Aerobika device, which helps. She is going to [place] on Saturday morning and is requesting some antibiotics for diarrhea.   he is allergic to SULFA DRUGS      he has been taking the statin replacement for almost a month and it is working well for her. She denies any side effects. She denies any GI side effects.      ROS      BP (!) 140/78   Pulse 86   Temp 36.7 °C (98.1 °F)   Resp 14   Ht 1.778 m (5' 10\")   SpO2 91% , Body mass index is 26.11 kg/m².    Physical Exam  Normal.    Physical Exam  Constitutional:       General: He is not in acute distress.     Appearance: He is normal weight. He is not ill-appearing, toxic-appearing or diaphoretic.   HENT:      Head: Normocephalic and atraumatic.      Right Ear: Tympanic membrane normal.      Left Ear: Tympanic membrane normal.      Mouth/Throat:      Mouth: Mucous membranes are moist.      Pharynx: Oropharynx is clear. No oropharyngeal exudate or posterior oropharyngeal erythema.   Eyes: "      Conjunctiva/sclera: Conjunctivae normal.   Cardiovascular:      Rate and Rhythm: Normal rate and regular rhythm.      Heart sounds: Normal heart sounds.   Pulmonary:      Effort: Respiratory distress present.      Breath sounds: Normal breath sounds. No wheezing, rhonchi or rales.   Musculoskeletal:      Cervical back: Neck supple.   Skin:     General: Skin is warm and dry.      Coloration: Skin is not jaundiced or pale.   Neurological:      General: No focal deficit present.   Psychiatric:         Mood and Affect: Mood normal.         Behavior: Behavior normal.         Thought Content: Thought content normal.         Judgment: Judgment normal.              Non-Provider Visit on 02/22/2024   Component Date Value Ref Range Status    SARS-CoV-2 by PCR 02/22/2024 Negative  Negative, Invalid Final    Influenza virus A RNA 02/22/2024 Negative  Negative, Invalid Final    Influenza virus B, PCR 02/22/2024 Negative  Negative, Invalid Final    RSV, PCR 02/22/2024 Negative  Negative, Invalid Final      Results            Assessment & Plan  1. Chest infection patient with CF  Has tested negative for flu, COVID and RSV  - as per CF specialist will order sputum cultures    I will prescribe Symbicort twice a day consistently and then as needed. I will prescribe Levaquin      2. Travel advice/ Diarrhea.   he will be leaving for [place] on Saturday. I will prescribe [employer].    3. Health maintenance.  She will get her next shingles vaccine when she returns from [place].        4. hyperlipidemia-tolerating statin well      Follow-up  The patient will follow up as needed.    Orders Placed This Encounter    CULTURE RESPIRATORY W/ [facility]    AFB Culture    Fungal Culture    levoFLOXacin (LEVAQUIN) 750 MG tablet    azithromycin (ZITHROMAX) 500 MG tablet    budesonide-formoterol (SYMBICORT) 160-4.5 MCG/ACT Aerosol                This note was created using voice recognition software (Dragon). The accuracy of the dictation is limited by  the abilities of the software. I have reviewed the note prior to signing, however some errors in grammar and context are still possible. If you have any questions related to this note please do not hesitate to contact our office.

## 2024-02-23 ENCOUNTER — HOSPITAL ENCOUNTER (OUTPATIENT)
Facility: MEDICAL CENTER | Age: 59
End: 2024-02-23
Attending: FAMILY MEDICINE
Payer: COMMERCIAL

## 2024-02-23 DIAGNOSIS — E84.9 CYSTIC FIBROSIS (HCC): ICD-10-CM

## 2024-02-23 DIAGNOSIS — J22 LRTI (LOWER RESPIRATORY TRACT INFECTION): ICD-10-CM

## 2024-02-23 PROCEDURE — 87206 SMEAR FLUORESCENT/ACID STAI: CPT

## 2024-02-23 PROCEDURE — 87070 CULTURE OTHR SPECIMN AEROBIC: CPT

## 2024-02-23 PROCEDURE — 87015 SPECIMEN INFECT AGNT CONCNTJ: CPT

## 2024-02-23 PROCEDURE — 87102 FUNGUS ISOLATION CULTURE: CPT

## 2024-02-23 PROCEDURE — 87205 SMEAR GRAM STAIN: CPT

## 2024-02-23 PROCEDURE — 87205 SMEAR GRAM STAIN: CPT | Mod: 91

## 2024-02-23 PROCEDURE — 87116 MYCOBACTERIA CULTURE: CPT

## 2024-02-24 LAB
FUNGUS SPEC FUNGUS STN: NORMAL
GRAM STN SPEC: NORMAL
MYCOBACTERIUM SPEC CULT: NORMAL
RHODAMINE-AURAMINE STN SPEC: NORMAL
RHODAMINE-AURAMINE STN SPEC: NORMAL
SIGNIFICANT IND 70042: NORMAL
SITE SITE: NORMAL
SOURCE SOURCE: NORMAL

## 2024-02-25 LAB
BACTERIA SPEC RESP CULT: NORMAL
GRAM STN SPEC: NORMAL
SIGNIFICANT IND 70042: NORMAL
SITE SITE: NORMAL
SOURCE SOURCE: NORMAL

## 2024-03-04 ENCOUNTER — APPOINTMENT (OUTPATIENT)
Dept: INTERNAL MEDICINE | Facility: IMAGING CENTER | Age: 59
End: 2024-03-04
Payer: COMMERCIAL

## 2024-03-04 DIAGNOSIS — D84.9 IMMUNOSUPPRESSED STATUS (HCC): ICD-10-CM

## 2024-03-04 DIAGNOSIS — Z79.60 LONG-TERM USE OF IMMUNOSUPPRESSANT MEDICATION: ICD-10-CM

## 2024-03-04 DIAGNOSIS — E84.9 CYSTIC FIBROSIS (HCC): ICD-10-CM

## 2024-03-05 ENCOUNTER — OFFICE VISIT (OUTPATIENT)
Dept: INTERNAL MEDICINE | Facility: IMAGING CENTER | Age: 59
End: 2024-03-05
Payer: COMMERCIAL

## 2024-03-05 ENCOUNTER — HOSPITAL ENCOUNTER (OUTPATIENT)
Dept: RADIOLOGY | Facility: MEDICAL CENTER | Age: 59
End: 2024-03-05
Attending: FAMILY MEDICINE
Payer: COMMERCIAL

## 2024-03-05 VITALS
SYSTOLIC BLOOD PRESSURE: 120 MMHG | OXYGEN SATURATION: 96 % | DIASTOLIC BLOOD PRESSURE: 70 MMHG | TEMPERATURE: 98.5 F | RESPIRATION RATE: 14 BRPM | HEART RATE: 89 BPM

## 2024-03-05 DIAGNOSIS — E84.9 CYSTIC FIBROSIS (HCC): ICD-10-CM

## 2024-03-05 DIAGNOSIS — J22 LRTI (LOWER RESPIRATORY TRACT INFECTION): ICD-10-CM

## 2024-03-05 DIAGNOSIS — D84.9 IMMUNOSUPPRESSED STATUS (HCC): ICD-10-CM

## 2024-03-05 PROCEDURE — 71046 X-RAY EXAM CHEST 2 VIEWS: CPT

## 2024-03-05 PROCEDURE — 3074F SYST BP LT 130 MM HG: CPT | Performed by: FAMILY MEDICINE

## 2024-03-05 PROCEDURE — 3078F DIAST BP <80 MM HG: CPT | Performed by: FAMILY MEDICINE

## 2024-03-05 PROCEDURE — 99213 OFFICE O/P EST LOW 20 MIN: CPT | Performed by: FAMILY MEDICINE

## 2024-03-05 RX ORDER — LEVOFLOXACIN 750 MG/1
750 TABLET, FILM COATED ORAL DAILY
Qty: 14 TABLET | Refills: 0 | Status: SHIPPED | OUTPATIENT
Start: 2024-03-05

## 2024-03-06 NOTE — PROGRESS NOTES
Verbal consent was acquired by the patient to use [employer] ambient listening note generation during this visit      Patient is a 58 y.o.male.established patient with h/o CF    History of Present Illness  The patient presents for evaluation of cough and chest congestion.  See previous dictation  He presented with a cough on February 21 with chest tightness, thick green mucus and cough  As per chest clinic instructions sputum cultures were obtained and patient was started on Levaquin  He completed his Levaquin 3 days ago and still has thick sputum and chest tightness  His sputum is now yellow in color   He is unable to get rid of the chest congestion and was coughing the whole time on his recent travels. He  He feels better.   The steroid inhaler helps a lot, but he continues to have chest tightness and continues to get the phlegm up. The phlegm is thick and yellow. He denies any head congestion, body aches, fever, or chills. He and his wife have not contracted COVID-19. He had similar symptoms in 11/2023 and was given a medication for a month. His culture came back positive. He has not had a chest x-ray. He denies any shortness of breath. He uses Aerobika, which helps break up the mucus. Mucinex does not help.            /70 (BP Location: Left arm, Patient Position: Sitting, BP Cuff Size: Adult)   Pulse 89   Temp 36.9 °C (98.5 °F) (Temporal)   Resp 14   SpO2 96% , There is no height or weight on file to calculate BMI.    Physical Exam      Physical Exam  Constitutional:       General: He is not in acute distress.     Appearance: Normal appearance. He is not ill-appearing, toxic-appearing or diaphoretic.   HENT:      Head: Normocephalic and atraumatic.      Right Ear: Tympanic membrane normal.      Left Ear: Tympanic membrane normal.   Cardiovascular:      Rate and Rhythm: Normal rate and regular rhythm.      Heart sounds: Normal heart sounds.   Pulmonary:      Effort: Pulmonary effort is normal. No  respiratory distress.      Breath sounds: No stridor. No wheezing, rhonchi or rales.   Chest:      Chest wall: No tenderness.   Neurological:      General: No focal deficit present.      Mental Status: He is alert.   Psychiatric:         Mood and Affect: Mood normal.         Behavior: Behavior normal.         Thought Content: Thought content normal.         Results             Hospital Outpatient Visit on 02/23/2024   Component Date Value Ref Range Status    Significant Indicator 02/23/2024 NEG   Final    Source 02/23/2024 RESP   Final    Site 02/23/2024 SPUTUM   Final    Culture Result 02/23/2024    Final                    Value:Light growth usual upper respiratory andrew  No clinically significant Staphylococcus aureus, Methicillin  Resistant Staphylococcus aureus, or Pseudomonas species  isolated.      Gram Stain Result 02/23/2024    Final                    Value:Specimen Quality Score: 2+  Moderate WBCs.  Rare epithelial cells.  Rare mixed bacteria, no predominant organism seen.      Significant Indicator 02/23/2024 .   Final    Source 02/23/2024 RESP   Final    Site 02/23/2024 SPUTUM   Final    Gram Stain Result 02/23/2024    Final                    Value:Specimen Quality Score: 2+  Moderate WBCs.  Rare epithelial cells.  Rare mixed bacteria, no predominant organism seen.     Hospital Outpatient Visit on 02/23/2024   Component Date Value Ref Range Status    Significant Indicator 02/23/2024 NEG   Preliminary    Source 02/23/2024 RESP   Preliminary    Site 02/23/2024 SPUTUM   Preliminary    Culture Result 02/23/2024 Culture in progress.   Preliminary    AFB Smear Results 02/23/2024 No acid fast bacilli seen.   Final    Significant Indicator 02/23/2024 NEG   Final    Source 02/23/2024 RESP   Final    Site 02/23/2024 SPUTUM   Final    AFB Smear Results 02/23/2024 No acid fast bacilli seen.   Final   Hospital Outpatient Visit on 02/23/2024   Component Date Value Ref Range Status    Significant Indicator 02/23/2024  NEG   Preliminary    Source 02/23/2024 RESP   Preliminary    Site 02/23/2024 Sputum   Preliminary    Culture Result 02/23/2024 No fungal growth to date.   Preliminary    Fungal Smear Results 02/23/2024 No fungal elements seen.   Final    Significant Indicator 02/23/2024 NEG   Final    Source 02/23/2024 RESP   Final    Site 02/23/2024 Sputum   Final    Fungal Smear Results 02/23/2024 No fungal elements seen.   Final   Non-Provider Visit on 02/22/2024   Component Date Value Ref Range Status    SARS-CoV-2 by PCR 02/22/2024 Negative  Negative, Invalid Final    Influenza virus A RNA 02/22/2024 Negative  Negative, Invalid Final    Influenza virus B, PCR 02/22/2024 Negative  Negative, Invalid Final    RSV, PCR 02/22/2024 Negative  Negative, Invalid Final      Assessment & Plan  1. LRTI (lower respiratory tract infection)     - DX-CHEST-2 VIEWS; Future    2. Immunosuppressed status (HCC)       3. Cystic fibrosis (HCC)     - DX-CHEST-2 VIEWS; Future       1. Cough and chest congestion.  He is oxygenating better today than when he came in last time. I will restart him on Levaquin. I will get a chest x-ray. He will consult with his pulmonologist.               This note was created using voice recognition software (Dragon). The accuracy of the dictation is limited by the abilities of the software. I have reviewed the note prior to signing, however some errors in grammar and context are still possible. If you have any questions related to this note please do not hesitate to contact our office.

## 2024-03-09 RX ORDER — EZETIMIBE 10 MG/1
10 TABLET ORAL DAILY
Qty: 90 TABLET | Refills: 1 | Status: SHIPPED | OUTPATIENT
Start: 2024-03-09

## 2024-03-19 LAB
FUNGUS SPEC CULT: NORMAL
FUNGUS SPEC FUNGUS STN: NORMAL
SIGNIFICANT IND 70042: NORMAL
SITE SITE: NORMAL
SOURCE SOURCE: NORMAL

## 2024-04-07 LAB
MYCOBACTERIUM SPEC CULT: NORMAL
RHODAMINE-AURAMINE STN SPEC: NORMAL
SIGNIFICANT IND 70042: NORMAL
SITE SITE: NORMAL
SOURCE SOURCE: NORMAL

## 2024-06-25 ENCOUNTER — APPOINTMENT (OUTPATIENT)
Dept: RADIOLOGY | Facility: MEDICAL CENTER | Age: 59
End: 2024-06-25
Attending: STUDENT IN AN ORGANIZED HEALTH CARE EDUCATION/TRAINING PROGRAM

## 2024-06-25 ENCOUNTER — HOSPITAL ENCOUNTER (EMERGENCY)
Facility: MEDICAL CENTER | Age: 59
End: 2024-06-26
Attending: STUDENT IN AN ORGANIZED HEALTH CARE EDUCATION/TRAINING PROGRAM
Payer: COMMERCIAL

## 2024-06-25 VITALS
HEART RATE: 96 BPM | TEMPERATURE: 97.2 F | HEIGHT: 70 IN | BODY MASS INDEX: 25.77 KG/M2 | DIASTOLIC BLOOD PRESSURE: 83 MMHG | WEIGHT: 180 LBS | SYSTOLIC BLOOD PRESSURE: 150 MMHG | OXYGEN SATURATION: 93 % | RESPIRATION RATE: 20 BRPM
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
## 2024-06-25 DIAGNOSIS — S02.2XXA CLOSED FRACTURE OF NASAL BONE, INITIAL ENCOUNTER: ICD-10-CM

## 2024-06-25 LAB
ABO GROUP BLD: NORMAL
ALBUMIN SERPL BCP-MCNC: 3.8 G/DL (ref 3.2–4.9)
ALBUMIN/GLOB SERPL: 1.5 G/DL
ALP SERPL-CCNC: 84 U/L
ALT SERPL-CCNC: 20 U/L (ref 2–50)
ANION GAP SERPL CALC-SCNC: 14 MMOL/L (ref 7–16)
AST SERPL-CCNC: 38 U/L (ref 12–45)
BILIRUB SERPL-MCNC: 0.3 MG/DL (ref 0.1–1.5)
BLD GP AB SCN SERPL QL: NORMAL
BUN SERPL-MCNC: 19 MG/DL (ref 8–22)
CALCIUM ALBUM COR SERPL-MCNC: 9.9 MG/DL (ref 8.5–10.5)
CALCIUM SERPL-MCNC: 9.7 MG/DL (ref 8.5–10.5)
CHLORIDE SERPL-SCNC: 107 MMOL/L (ref 96–112)
CO2 SERPL-SCNC: 20 MMOL/L (ref 20–33)
CREAT SERPL-MCNC: 0.77 MG/DL (ref 0.5–1.4)
ERYTHROCYTE [DISTWIDTH] IN BLOOD BY AUTOMATED COUNT: 53.6 FL (ref 35.9–50)
ETHANOL BLD-MCNC: 165.9 MG/DL
GFR SERPLBLD CREATININE-BSD FMLA CKD-EPI: 69 ML/MIN/1.73 M 2
GLOBULIN SER CALC-MCNC: 2.6 G/DL (ref 1.9–3.5)
GLUCOSE SERPL-MCNC: 98 MG/DL (ref 65–99)
HCT VFR BLD AUTO: 45.2 % (ref 42–52)
HGB BLD-MCNC: 15.5 G/DL (ref 14–18)
MCH RBC QN AUTO: 30.8 PG (ref 27–33)
MCHC RBC AUTO-ENTMCNC: 34.3 G/DL (ref 32.3–36.5)
MCV RBC AUTO: 89.9 FL (ref 81.4–97.8)
PLATELET # BLD AUTO: 262 K/UL (ref 164–446)
PMV BLD AUTO: 8.7 FL (ref 9–12.9)
POTASSIUM SERPL-SCNC: 4.3 MMOL/L (ref 3.6–5.5)
PROT SERPL-MCNC: 6.4 G/DL (ref 6–8.2)
RBC # BLD AUTO: 5.03 M/UL (ref 4.7–6.1)
RH BLD: NORMAL
SODIUM SERPL-SCNC: 141 MMOL/L (ref 135–145)
WBC # BLD AUTO: 7.5 K/UL (ref 4.8–10.8)

## 2024-06-25 PROCEDURE — 86900 BLOOD TYPING SEROLOGIC ABO: CPT

## 2024-06-25 PROCEDURE — 71260 CT THORAX DX C+: CPT

## 2024-06-25 PROCEDURE — 72131 CT LUMBAR SPINE W/O DYE: CPT

## 2024-06-25 PROCEDURE — 36415 COLL VENOUS BLD VENIPUNCTURE: CPT

## 2024-06-25 PROCEDURE — 96374 THER/PROPH/DIAG INJ IV PUSH: CPT

## 2024-06-25 PROCEDURE — 96375 TX/PRO/DX INJ NEW DRUG ADDON: CPT

## 2024-06-25 PROCEDURE — 80053 COMPREHEN METABOLIC PANEL: CPT

## 2024-06-25 PROCEDURE — 700117 HCHG RX CONTRAST REV CODE 255: Performed by: STUDENT IN AN ORGANIZED HEALTH CARE EDUCATION/TRAINING PROGRAM

## 2024-06-25 PROCEDURE — 86901 BLOOD TYPING SEROLOGIC RH(D): CPT

## 2024-06-25 PROCEDURE — 72128 CT CHEST SPINE W/O DYE: CPT

## 2024-06-25 PROCEDURE — 70486 CT MAXILLOFACIAL W/O DYE: CPT

## 2024-06-25 PROCEDURE — 70450 CT HEAD/BRAIN W/O DYE: CPT

## 2024-06-25 PROCEDURE — 305948 HCHG GREEN TRAUMA ACT PRE-NOTIFY NO CC

## 2024-06-25 PROCEDURE — 73100 X-RAY EXAM OF WRIST: CPT | Mod: RT

## 2024-06-25 PROCEDURE — 72125 CT NECK SPINE W/O DYE: CPT

## 2024-06-25 PROCEDURE — 304217 HCHG IRRIGATION SYSTEM

## 2024-06-25 PROCEDURE — 85027 COMPLETE CBC AUTOMATED: CPT

## 2024-06-25 PROCEDURE — 86850 RBC ANTIBODY SCREEN: CPT

## 2024-06-25 PROCEDURE — 71045 X-RAY EXAM CHEST 1 VIEW: CPT

## 2024-06-25 PROCEDURE — 700101 HCHG RX REV CODE 250: Performed by: STUDENT IN AN ORGANIZED HEALTH CARE EDUCATION/TRAINING PROGRAM

## 2024-06-25 PROCEDURE — 82077 ASSAY SPEC XCP UR&BREATH IA: CPT

## 2024-06-25 PROCEDURE — 700111 HCHG RX REV CODE 636 W/ 250 OVERRIDE (IP): Mod: JZ | Performed by: STUDENT IN AN ORGANIZED HEALTH CARE EDUCATION/TRAINING PROGRAM

## 2024-06-25 PROCEDURE — 99285 EMERGENCY DEPT VISIT HI MDM: CPT

## 2024-06-25 RX ORDER — ONDANSETRON 2 MG/ML
INJECTION INTRAMUSCULAR; INTRAVENOUS
Status: COMPLETED | OUTPATIENT
Start: 2024-06-25 | End: 2024-06-25

## 2024-06-25 RX ORDER — ACETAMINOPHEN 10 MG/ML
1000 INJECTION, SOLUTION INTRAVENOUS ONCE
Status: COMPLETED | OUTPATIENT
Start: 2024-06-25 | End: 2024-06-25

## 2024-06-25 RX ORDER — KETOROLAC TROMETHAMINE 15 MG/ML
15 INJECTION, SOLUTION INTRAMUSCULAR; INTRAVENOUS ONCE
Status: COMPLETED | OUTPATIENT
Start: 2024-06-25 | End: 2024-06-25

## 2024-06-25 RX ADMIN — Medication 3 ML: at 23:12

## 2024-06-25 RX ADMIN — IOHEXOL 100 ML: 350 INJECTION, SOLUTION INTRAVENOUS at 22:22

## 2024-06-25 RX ADMIN — FENTANYL CITRATE 50 MCG: 50 INJECTION, SOLUTION INTRAMUSCULAR; INTRAVENOUS at 22:11

## 2024-06-25 RX ADMIN — ONDANSETRON 4 MG: 2 INJECTION INTRAMUSCULAR; INTRAVENOUS at 22:10

## 2024-06-25 RX ADMIN — ACETAMINOPHEN 1000 MG: 1000 INJECTION INTRAVENOUS at 23:10

## 2024-06-25 RX ADMIN — KETOROLAC TROMETHAMINE 15 MG: 15 INJECTION, SOLUTION INTRAMUSCULAR; INTRAVENOUS at 23:22

## 2024-06-26 LAB — ABO + RH BLD: NORMAL

## 2024-06-26 PROCEDURE — 73630 X-RAY EXAM OF FOOT: CPT | Mod: RT
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
## 2024-06-26 NOTE — ED NOTES
Assist RN:     Reviewed discharge instructions, pt verbalized understanding of follow up with Facial Sx and PCP. Pt encouraged to return to the ED for fever, vomiting, or any other new/concerning symptoms.
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
## 2024-06-26 NOTE — DISCHARGE INSTRUCTIONS
We have placed referral and given contact information for a facial surgeon.  Please avoid blowing your nose using straws.  If you have fever, difficulty breathing uncontrolled pain please return to the emergency department.

## 2024-09-13 ENCOUNTER — NON-PROVIDER VISIT (OUTPATIENT)
Dept: INTERNAL MEDICINE | Facility: IMAGING CENTER | Age: 59
End: 2024-09-13
Payer: COMMERCIAL

## 2024-09-13 DIAGNOSIS — Z23 NEED FOR VACCINATION: ICD-10-CM

## 2024-09-13 PROCEDURE — 90750 HZV VACC RECOMBINANT IM: CPT | Mod: JZ | Performed by: FAMILY MEDICINE

## 2024-09-13 PROCEDURE — 90471 IMMUNIZATION ADMIN: CPT | Performed by: FAMILY MEDICINE

## 2024-10-24 ENCOUNTER — PATIENT MESSAGE (OUTPATIENT)
Dept: INTERNAL MEDICINE | Facility: IMAGING CENTER | Age: 59
End: 2024-10-24
Payer: COMMERCIAL

## 2024-10-24 RX ORDER — PREDNISONE 10 MG/1
TABLET ORAL
Qty: 28 TABLET | Refills: 0 | Status: SHIPPED | OUTPATIENT
Start: 2024-10-24

## 2025-02-19 ENCOUNTER — OFFICE VISIT (OUTPATIENT)
Dept: INTERNAL MEDICINE | Facility: IMAGING CENTER | Age: 60
End: 2025-02-19
Payer: COMMERCIAL

## 2025-02-19 VITALS
DIASTOLIC BLOOD PRESSURE: 70 MMHG | OXYGEN SATURATION: 96 % | SYSTOLIC BLOOD PRESSURE: 124 MMHG | RESPIRATION RATE: 14 BRPM | HEART RATE: 99 BPM | TEMPERATURE: 97.3 F

## 2025-02-19 DIAGNOSIS — E84.9 CYSTIC FIBROSIS (HCC): ICD-10-CM

## 2025-02-19 DIAGNOSIS — J22 LRTI (LOWER RESPIRATORY TRACT INFECTION): ICD-10-CM

## 2025-02-19 PROCEDURE — 3074F SYST BP LT 130 MM HG: CPT | Performed by: FAMILY MEDICINE

## 2025-02-19 PROCEDURE — 99214 OFFICE O/P EST MOD 30 MIN: CPT | Performed by: FAMILY MEDICINE

## 2025-02-19 PROCEDURE — 3078F DIAST BP <80 MM HG: CPT | Performed by: FAMILY MEDICINE

## 2025-02-19 RX ORDER — LINEZOLID 600 MG/1
600 TABLET, FILM COATED ORAL
COMMUNITY
Start: 2025-02-17 | End: 2025-02-27

## 2025-02-23 NOTE — PROGRESS NOTES
Verbal consent was acquired by the patient to use reQwip ambient listening note generation during this visit     Patient is a 59 y.o.male.established patient     History of Present Illness  The patient presents for evaluation of an upper respiratory infection and arthritis.    He has been experiencing symptoms of an upper respiratory infection since Sunday, which he attributes to contact with an ill couple. He reports no fever, chills, or body aches but describes a sensation of chest tightness upon awakening on Sunday, accompanied by difficulty in breathing. His oxygen saturation was recorded as 89 on Sunday and Monday nights, during which he experienced significant respiratory distress. However, his condition improved following the initiation of antibiotic therapy, as evidenced by a restful sleep last night. He has not undergone testing for COVID-19 or RSV. He is seeking a lung examination today. He attempted to seek medical attention at [facility] but was unable to do so due to the holiday closure. Consequently, he started a course of Zyvox yesterday, which has resulted in symptomatic improvement.    He has been managing his arthritis with a regimen prescribed by his rheumatologist, which was initially effective. However, the treatment ceased to be beneficial last fall, necessitating daily prednisone intake due to severe flare-ups. After a period of 2 months on daily prednisone, he consulted his rheumatologist, who suggested the addition of a biologic to his treatment plan. The rheumatologist also recommended discontinuing his cholesterol medication to establish a baseline and determine if it was contributing to his symptoms. Despite these adjustments, there was no change in his condition. He has been off the cholesterol medication and on Humira for 1 month, which has successfully prevented any arthritis flare-ups. He plans to continue this regimen for a while longer before reintroducing the cholesterol  medication.    MEDICATIONS  Current: Zyvox, Humira, prednisone            /70   Pulse 99   Temp 36.3 °C (97.3 °F)   Resp 14   SpO2 96% , There is no height or weight on file to calculate BMI.    Physical Exam  Lungs are clear.    Physical Exam  Constitutional:       General: He is not in acute distress.     Appearance: Normal appearance. He is normal weight. He is not ill-appearing, toxic-appearing or diaphoretic.   HENT:      Head: Normocephalic and atraumatic.      Ears:      Comments:       Nose: Nose normal.   Eyes:      Conjunctiva/sclera: Conjunctivae normal.   Cardiovascular:      Rate and Rhythm: Normal rate.   Pulmonary:      Effort: Pulmonary effort is normal.   Musculoskeletal:      Cervical back: Neck supple.   Neurological:      General: No focal deficit present.      Mental Status: He is alert.   Psychiatric:         Mood and Affect: Mood normal.         Behavior: Behavior normal.         Thought Content: Thought content normal.         Judgment: Judgment normal.             Results            Assessment & Plan  1.Cfw/ respiratory infection.  He began experiencing symptoms on Sunday, including chest tightness and difficulty breathing. His oxygen saturation was 89% on Sunday night. He started taking Zyvox (linezolid) yesterday, which has significantly improved his symptoms. His lungs sound clear upon examination. He is advised to continue the antibiotic regimen as prescribed. If symptoms worsen or do not improve, he should seek further medical attention. He is also informed about the availability of home testing kits for COVID-19 and influenza A and B, and RSV. He is encouraged to utilize these resources for self-monitoring. Additionally, he is informed about the option of scheduling an appointment with the nurse for a swab test to rule out other potential causes of his symptoms.    2. Arthritis.  He has been experiencing arthritis flare-ups and was previously on a regimen that included  prednisone. He has now started Humira one month ago, which has effectively managed his symptoms with no recent flare-ups. He will continue with Humira and plans to reintroduce his cholesterol medication soon. He is advised to follow up with his rheumatologist as needed.               This note was created using voice recognition software (Dragon). The accuracy of the dictation is limited by the abilities of the software. I have reviewed the note prior to signing, however some errors in grammar and context are still possible. If you have any questions related to this note please do not hesitate to contact our office.